# Patient Record
Sex: FEMALE | Race: WHITE | NOT HISPANIC OR LATINO | Employment: OTHER | URBAN - METROPOLITAN AREA
[De-identification: names, ages, dates, MRNs, and addresses within clinical notes are randomized per-mention and may not be internally consistent; named-entity substitution may affect disease eponyms.]

---

## 2018-04-04 ENCOUNTER — OFFICE VISIT (OUTPATIENT)
Dept: URGENT CARE | Facility: CLINIC | Age: 23
End: 2018-04-04
Payer: COMMERCIAL

## 2018-04-04 VITALS
HEART RATE: 65 BPM | WEIGHT: 152 LBS | BODY MASS INDEX: 23.04 KG/M2 | HEIGHT: 68 IN | OXYGEN SATURATION: 100 % | DIASTOLIC BLOOD PRESSURE: 8 MMHG | SYSTOLIC BLOOD PRESSURE: 128 MMHG | TEMPERATURE: 99 F

## 2018-04-04 DIAGNOSIS — R21 RASH: Primary | ICD-10-CM

## 2018-04-04 PROCEDURE — 99203 OFFICE O/P NEW LOW 30 MIN: CPT | Mod: S$GLB,,, | Performed by: EMERGENCY MEDICINE

## 2018-04-04 RX ORDER — METHYLPREDNISOLONE 4 MG/1
4 TABLET ORAL DAILY
Qty: 1 PACKAGE | Refills: 0 | Status: SHIPPED | OUTPATIENT
Start: 2018-04-04 | End: 2018-04-09

## 2018-04-04 NOTE — PROGRESS NOTES
"Subjective:       Patient ID: Phylicia Klein is a 22 y.o. female.    Vitals:  height is 5' 8" (1.727 m) and weight is 68.9 kg (152 lb). Her oral temperature is 98.7 °F (37.1 °C). Her blood pressure is 128/8 (abnormal) and her pulse is 65. Her oxygen saturation is 100%.     Chief Complaint: Rash (whole body)    PT has used lotion      Rash   This is a new problem. The current episode started 1 to 4 weeks ago. The affected locations include the torso, left arm and right arm. The rash is characterized by dryness, itchiness and redness. Pertinent negatives include no fever, joint pain, shortness of breath or sore throat. Past treatments include nothing. The treatment provided no relief.     Review of Systems   Constitution: Negative for chills and fever.   HENT: Negative for sore throat.    Respiratory: Negative for shortness of breath.    Skin: Positive for color change (on back and stomach), itching (arms, legs, torso) and rash (Pt states she contact dermatitis).   Musculoskeletal: Negative for joint pain.       Objective:      Physical Exam   Constitutional: She is oriented to person, place, and time. She appears well-developed and well-nourished.   HENT:   Head: Normocephalic and atraumatic. Head is without abrasion, without contusion and without laceration.   Right Ear: External ear normal.   Left Ear: External ear normal.   Nose: Nose normal.   Mouth/Throat: Oropharynx is clear and moist.   Eyes: Conjunctivae, EOM and lids are normal. Pupils are equal, round, and reactive to light.   Neck: Trachea normal, full passive range of motion without pain and phonation normal. Neck supple.   Cardiovascular: Normal rate, regular rhythm and normal heart sounds.    Pulmonary/Chest: Effort normal and breath sounds normal. No stridor. No respiratory distress.   Musculoskeletal: Normal range of motion.   Neurological: She is alert and oriented to person, place, and time.   Skin: Skin is warm, dry and intact. Capillary refill " takes less than 2 seconds. No abrasion, no bruising, no burn, no ecchymosis, no laceration, no lesion and no rash noted. No erythema.        Psychiatric: She has a normal mood and affect. Her speech is normal and behavior is normal. Judgment and thought content normal. Cognition and memory are normal.   Nursing note and vitals reviewed.      Assessment:       1. Rash        Plan:         Rash  -     methylPREDNISolone (MEDROL DOSEPACK) 4 mg tablet; Take 1 tablet (4 mg total) by mouth once daily. use as directed  Dispense: 1 Package; Refill: 0

## 2018-09-18 ENCOUNTER — OFFICE VISIT (OUTPATIENT)
Dept: URGENT CARE | Facility: CLINIC | Age: 23
End: 2018-09-18
Payer: COMMERCIAL

## 2018-09-18 VITALS
HEIGHT: 68 IN | TEMPERATURE: 98 F | BODY MASS INDEX: 23.04 KG/M2 | OXYGEN SATURATION: 99 % | DIASTOLIC BLOOD PRESSURE: 78 MMHG | SYSTOLIC BLOOD PRESSURE: 121 MMHG | WEIGHT: 152 LBS | HEART RATE: 81 BPM

## 2018-09-18 DIAGNOSIS — J02.9 SORE THROAT: ICD-10-CM

## 2018-09-18 DIAGNOSIS — J02.0 STREP PHARYNGITIS: Primary | ICD-10-CM

## 2018-09-18 LAB
CTP QC/QA: YES
S PYO RRNA THROAT QL PROBE: POSITIVE

## 2018-09-18 PROCEDURE — 87880 STREP A ASSAY W/OPTIC: CPT | Mod: QW,S$GLB,, | Performed by: NURSE PRACTITIONER

## 2018-09-18 PROCEDURE — 99214 OFFICE O/P EST MOD 30 MIN: CPT | Mod: S$GLB,,, | Performed by: NURSE PRACTITIONER

## 2018-09-18 RX ORDER — AMOXICILLIN 500 MG/1
500 CAPSULE ORAL 2 TIMES DAILY
Qty: 20 CAPSULE | Refills: 0 | Status: SHIPPED | OUTPATIENT
Start: 2018-09-18 | End: 2018-09-28

## 2018-09-18 NOTE — PATIENT INSTRUCTIONS
Strep Throat  Strep throat is a throat infection caused by a bacteria called group A Streptococcus bacteria (group A strep). The bacteria live in the nose and throat. Strep throat is contagious and spreads easily from person to person through airborne droplets when an infected person coughs, sneezes, or talks. Good hand washing is important to help prevent the spread of this illness.  Children diagnosed with strep throat should not attend school or  until they have been taking antibiotics and had no fever for 24 hours.  Strep throat mainly affects school-aged children between 5 and 15 years of age, but can affect adults too. When it isn't treated, it can lead to serious problems including rheumatic fever (an inflammation of the joints and heart) and kidney damage.    How is strep throat spread?  Strep throat can be easily spread from an infected person's saliva by:  · Drinking and eating after them  · Sharing a straw, cup, toothbrushes, and eating utensils  When to go to the emergency room (ER)  Call 911 if your child has trouble breathing or swallowing. Call your healthcare provider about other symptoms of strep throat, such as:  · Throat pain, especially when swallowing  · Red, swollen tonsils  · Swollen lymph glands  · Stomachache; sometimes, vomiting in younger children  · Pus in the back of the throat  What to expect in the ER  · Your child will be examined and the healthcare provider will ask about his or her medical history.  · The child's tonsils will be examined. A sample of fluid may be taken from the back of the throat using a soft swab. The sample can be checked right away for the bacteria that cause strep throat. Another sample may also be sent to a lab for testing.  · An antibiotic is usually prescribed to kill the bacteria. Be sure your child takes all the medicine, even if he or she starts to feel better. (Note that antibiotics will not help a viral throat infection.)  · If swallowing is  very painful, painkilling medicine may also be prescribed.  When to call your healthcare provider  Call your healthcare provider if your otherwise healthy child has finished the treatment for strep throat and has:  · Joint pain or swelling  · Shortness of breath  · Signs of dehydration (no tears when crying and not urinating for more than 8 hours)  · Ear pain or pressure  · Headaches  · Rash  · Fever (see Fever and children, below)  Fever and children  Always use a digital thermometer to check your childs temperature. Never use a mercury thermometer.  For infants and toddlers, be sure to use a rectal thermometer correctly. A rectal thermometer may accidentally poke a hole in (perforate) the rectum. It may also pass on germs from the stool. Always follow the product makers directions for proper use. If you dont feel comfortable taking a rectal temperature, use another method. When you talk to your childs healthcare provider, tell him or her which method you used to take your childs temperature.  Here are guidelines for fever temperature. Ear temperatures arent accurate before 6 months of age. Dont take an oral temperature until your child is at least 4 years old.  Infant under 3 months old:  · Ask your childs healthcare provider how you should take the temperature.  · Rectal or forehead (temporal artery) temperature of 100.4°F (38°C) or higher, or as directed by the provider  · Armpit temperature of 99°F (37.2°C) or higher, or as directed by the provider  Child age 3 to 36 months:  · Rectal, forehead (temporal artery), or ear temperature of 102°F (38.9°C) or higher, or as directed by the provider  · Armpit temperature of 101°F (38.3°C) or higher, or as directed by the provider  Child of any age:  · Repeated temperature of 104°F (40°C) or higher, or as directed by the provider  · Fever that lasts more than 24 hours in a child under 2 years old. Or a fever that lasts for 3 days in a child 2 years or older.    Easing strep throat symptoms  These tips can help ease your child's symptoms:  · Offer easy-to-swallow foods, such as soup, applesauce, popsicles, cold drinks, milk shakes, and yogurt.  · Provide a soft diet and avoid spicy or acidic foods.  · Use a cool-mist humidifier in the child's bedroom.  · Gargle with saltwater (for older children and adults only). Mix 1/4 teaspoon salt in 1 cup (8 oz) of warm water.   Date Last Reviewed: 1/1/2017  © 3774-6049 [employer]. [address] [place] [address], [place] [address]. All rights reserved. This information is not intended as a substitute for professional medical care. Always follow your healthcare professional's instructions.

## 2018-09-18 NOTE — LETTER
September 18, 2018      [facility] [address] [address], [address]  [place] [address]  Phone: [phone]  Fax: [phone]       Patient: Phylicia Klein   YOB: 1995  Date of Visit: 09/18/2018    To Whom It May Concern:    Rafael Klein  was at [facility] on 09/18/2018. She may return to work/school on 9/19/18 with no restrictions. If you have any questions or concerns, or if I can be of further assistance, please do not hesitate to contact me.    Sincerely,    Yana Howard NP

## 2018-10-08 ENCOUNTER — TELEPHONE (OUTPATIENT)
Dept: FAMILY MEDICINE | Facility: CLINIC | Age: 23
End: 2018-10-08

## 2018-10-08 NOTE — TELEPHONE ENCOUNTER
Deanna will be coming in tomorrow at 530 PM.  Please add to my schedule.    Issues with anxiety.    Trae Mauricio MD

## 2018-10-09 ENCOUNTER — TELEPHONE (OUTPATIENT)
Dept: FAMILY MEDICINE | Facility: CLINIC | Age: 23
End: 2018-10-09

## 2018-10-10 ENCOUNTER — LAB VISIT (OUTPATIENT)
Dept: LAB | Facility: HOSPITAL | Age: 23
End: 2018-10-10
Attending: EMERGENCY MEDICINE
Payer: COMMERCIAL

## 2018-10-10 ENCOUNTER — TELEPHONE (OUTPATIENT)
Dept: FAMILY MEDICINE | Facility: CLINIC | Age: 23
End: 2018-10-10

## 2018-10-10 ENCOUNTER — OFFICE VISIT (OUTPATIENT)
Dept: FAMILY MEDICINE | Facility: CLINIC | Age: 23
End: 2018-10-10
Payer: COMMERCIAL

## 2018-10-10 VITALS
SYSTOLIC BLOOD PRESSURE: 120 MMHG | HEART RATE: 80 BPM | DIASTOLIC BLOOD PRESSURE: 80 MMHG | WEIGHT: 155.63 LBS | RESPIRATION RATE: 16 BRPM | BODY MASS INDEX: 23.67 KG/M2

## 2018-10-10 DIAGNOSIS — F41.1 GAD (GENERALIZED ANXIETY DISORDER): ICD-10-CM

## 2018-10-10 DIAGNOSIS — F41.1 GAD (GENERALIZED ANXIETY DISORDER): Primary | ICD-10-CM

## 2018-10-10 LAB
BASOPHILS # BLD AUTO: 0.03 K/UL
BASOPHILS NFR BLD: 0.7 %
DIFFERENTIAL METHOD: ABNORMAL
EOSINOPHIL # BLD AUTO: 0 K/UL
EOSINOPHIL NFR BLD: 1 %
ERYTHROCYTE [DISTWIDTH] IN BLOOD BY AUTOMATED COUNT: 12.4 %
HCT VFR BLD AUTO: 39.4 %
HGB BLD-MCNC: 13.7 G/DL
IMM GRANULOCYTES # BLD AUTO: 0 K/UL
IMM GRANULOCYTES NFR BLD AUTO: 0 %
LYMPHOCYTES # BLD AUTO: 1.7 K/UL
LYMPHOCYTES NFR BLD: 40.4 %
MCH RBC QN AUTO: 31.9 PG
MCHC RBC AUTO-ENTMCNC: 34.8 G/DL
MCV RBC AUTO: 92 FL
MONOCYTES # BLD AUTO: 0.4 K/UL
MONOCYTES NFR BLD: 10.7 %
NEUTROPHILS # BLD AUTO: 2 K/UL
NEUTROPHILS NFR BLD: 47.2 %
NRBC BLD-RTO: 0 /100 WBC
PLATELET # BLD AUTO: 260 K/UL
PMV BLD AUTO: 10.6 FL
RBC # BLD AUTO: 4.3 M/UL
WBC # BLD AUTO: 4.13 K/UL

## 2018-10-10 PROCEDURE — 84443 ASSAY THYROID STIM HORMONE: CPT

## 2018-10-10 PROCEDURE — 99999 PR PBB SHADOW E&M-EST. PATIENT-LVL III: CPT | Mod: PBBFAC,,, | Performed by: EMERGENCY MEDICINE

## 2018-10-10 PROCEDURE — 36415 COLL VENOUS BLD VENIPUNCTURE: CPT | Mod: PO

## 2018-10-10 PROCEDURE — 85025 COMPLETE CBC W/AUTO DIFF WBC: CPT

## 2018-10-10 PROCEDURE — 99213 OFFICE O/P EST LOW 20 MIN: CPT | Mod: S$GLB,,, | Performed by: EMERGENCY MEDICINE

## 2018-10-10 PROCEDURE — 3008F BODY MASS INDEX DOCD: CPT | Mod: CPTII,S$GLB,, | Performed by: EMERGENCY MEDICINE

## 2018-10-10 RX ORDER — ESCITALOPRAM OXALATE 5 MG/1
5 TABLET ORAL DAILY
Qty: 30 TABLET | Refills: 2 | Status: SHIPPED | OUTPATIENT
Start: 2018-10-10 | End: 2019-02-02 | Stop reason: SDUPTHER

## 2018-10-10 NOTE — TELEPHONE ENCOUNTER
----- Message from Michael Evans sent at 10/10/2018 12:31 PM CDT -----  Contact: same  Patient called in and stated she was offered an appt for 3pm today 10/10 instead of her 3:30pm and now she can come in for 3pm, if still available.    Patient would like a call back at [phone]

## 2018-10-10 NOTE — PROGRESS NOTES
Subjective:   THIS NOTE IS DONE WITH VOICE RECOGNITION        Patient ID: Phylicia Klein is a 23 y.o. female.    Chief Complaint: Anxiety      HPI     She has graduated from [employer] with a degree in marketing.  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    She has noticed increased anxiety.  She describes herself as having had anxiety for most of her life.  At this point it is interfering with her sleep and her job performance.  She would like to consider medications to address this.    ANABEL-7 Questionnaire    Over the last two weeks, how often have you been bothered by the following problems:  0 Not at all   1 Several days  2 More than half the days  3 Nearly every day    Feeling nervous, anxious, or on edge 2    Not being able to stop or control worrying  1    Worrying too much about different things  3    Trouble relaxing  1    Being so restless that it's hard to sit still 2    Becoming easily annoyed or irritable  3    Feeling afraid as if something awful might happen 3      How difficult have these problems made it for you   to do your work,  take care of things at home, or   get along with other people? Not difficult at all   Somewhat difficult   Very difficult   Extremely difficult    Total Score: 13    Total Score Anxiety Severity  1-4  Minimal anxiety  5-9  Mild anxiety  10-14  Moderate anxiety  15-21  Severe anxiety        No current outpatient medications on file.     No current facility-administered medications for this visit.          Review of Systems   Constitutional: Negative for appetite change, chills, fatigue and fever.   HENT: Negative for ear discharge, hearing loss, rhinorrhea, sneezing and tinnitus.    Eyes: Negative for discharge, itching and visual disturbance.   Respiratory: Negative for cough, choking, shortness of breath and wheezing.    Cardiovascular:  Negative for chest pain, palpitations and leg swelling.   Gastrointestinal: Negative for abdominal distention, abdominal pain, constipation and diarrhea.   Genitourinary: Negative for dyspareunia, dysuria, frequency, hematuria, menstrual problem, pelvic pain, vaginal discharge and vaginal pain.   Musculoskeletal: Negative for arthralgias, back pain and joint swelling.   Skin: Negative.    Neurological: Negative for dizziness, tremors, seizures, syncope, numbness and headaches.   Hematological: Negative for adenopathy. Does not bruise/bleed easily.   Psychiatric/Behavioral: Positive for sleep disturbance. Negative for confusion, dysphoric mood and hallucinations. The patient is nervous/anxious.      gyn:  She denies any possibility of pregnancy.  Her.  Her regular.  She has been screen for polycystic ovary disease.  She is not aware that she has this.    Objective:      Physical Exam   Constitutional: She is oriented to person, place, and time. She appears well-developed and well-nourished. No distress.   HENT:   Head: Normocephalic and atraumatic.   Mouth/Throat: Oropharynx is clear and moist.   Eyes: Conjunctivae are normal. Pupils are equal, round, and reactive to light. No scleral icterus.   Neck: Normal range of motion. Neck supple. No thyromegaly present.   Cardiovascular: Normal rate, regular rhythm and normal heart sounds.  No extrasystoles are present.   Pulmonary/Chest: Effort normal and breath sounds normal. No tachypnea. She has no wheezes. She has no rales.   Abdominal: Soft. Bowel sounds are normal. She exhibits no distension. There is no tenderness.   Musculoskeletal: Normal range of motion. She exhibits no edema or tenderness.        Right shoulder: She exhibits normal range of motion, no tenderness, no bony tenderness, no effusion and no deformity.   Lymphadenopathy:     She has no cervical adenopathy.     She has no axillary adenopathy.        Right: No inguinal and no supraclavicular adenopathy  present.   Neurological: She is alert and oriented to person, place, and time. She has normal reflexes. No cranial nerve deficit. Coordination normal.   Skin: Skin is warm and dry. Capillary refill takes less than 2 seconds. No ecchymosis, no lesion and no rash noted. No erythema.   Psychiatric: She has a normal mood and affect. Her behavior is normal. Judgment and thought content normal. Her mood appears not anxious. She does not exhibit a depressed mood.   Vitals reviewed.      Assessment:       1. ANABEL (generalized anxiety disorder)        Plan:       1. ANABEL (generalized anxiety disorder)  Long standing  Options for intervention reviewed  Mind body interventions discussed.  She is not planning pregnancy in the near future.  She will update me in a month in regards to her Lexapro therapy.    - TSH; Future  - CBC auto differential; Future  - escitalopram oxalate (LEXAPRO) 5 MG Tab; Take 1 tablet (5 mg total) by mouth once daily.  Dispense: 30 tablet; Refill: 2

## 2018-10-11 ENCOUNTER — TELEPHONE (OUTPATIENT)
Dept: FAMILY MEDICINE | Facility: CLINIC | Age: 23
End: 2018-10-11

## 2018-10-11 LAB — TSH SERPL DL<=0.005 MIU/L-ACNC: 0.93 UIU/ML

## 2018-10-14 PROBLEM — F41.1 GAD (GENERALIZED ANXIETY DISORDER): Chronic | Status: ACTIVE | Noted: 2018-10-14

## 2018-10-14 NOTE — PATIENT INSTRUCTIONS
Deanna,    Lab Results   Component Value Date    TSH 0.925 10/10/2018     Lab Results   Component Value Date    WBC 4.13 10/10/2018    HGB 13.7 10/10/2018    HCT 39.4 10/10/2018    MCV 92 10/10/2018     10/10/2018     I expected this blood work to be normal and indeed it is.    Please try the Lexapro, being consistent on a daily basis.  I would like to know what is happening after 1 month.    It is important for you to take some accountability for this and try to do some good things for yourself.  This is predominantly around getting enough exercise and sleep.    Trae Mauricio MD

## 2018-11-27 ENCOUNTER — OFFICE VISIT (OUTPATIENT)
Dept: OBSTETRICS AND GYNECOLOGY | Facility: CLINIC | Age: 23
End: 2018-11-27
Payer: COMMERCIAL

## 2018-11-27 VITALS
WEIGHT: 156.75 LBS | SYSTOLIC BLOOD PRESSURE: 116 MMHG | BODY MASS INDEX: 23.83 KG/M2 | DIASTOLIC BLOOD PRESSURE: 72 MMHG

## 2018-11-27 DIAGNOSIS — Z01.419 ENCOUNTER FOR GYNECOLOGICAL EXAMINATION WITHOUT ABNORMAL FINDING: Primary | ICD-10-CM

## 2018-11-27 PROCEDURE — 88175 CYTOPATH C/V AUTO FLUID REDO: CPT

## 2018-11-27 PROCEDURE — 99999 PR PBB SHADOW E&M-EST. PATIENT-LVL III: CPT | Mod: PBBFAC,,, | Performed by: OBSTETRICS & GYNECOLOGY

## 2018-11-27 PROCEDURE — 99385 PREV VISIT NEW AGE 18-39: CPT | Mod: S$GLB,,, | Performed by: OBSTETRICS & GYNECOLOGY

## 2018-11-27 NOTE — PROGRESS NOTES
Chief Complaint   Patient presents with    Well Woman       History of Present Illness: Phylicia Klein is a 23 y.o. female that presents today 11/27/2018 for well gyn visit.    History reviewed. No pertinent past medical history.    Past Surgical History:   Procedure Laterality Date    ANTERIOR CRUCIATE LIGAMENT REPAIR  2010       Current Outpatient Medications   Medication Sig Dispense Refill    escitalopram oxalate (LEXAPRO) 5 MG Tab Take 1 tablet (5 mg total) by mouth once daily. 30 tablet 2     No current facility-administered medications for this visit.        Review of patient's allergies indicates:   Allergen Reactions    Sulfa (sulfonamide antibiotics)      Other reaction(s): Hives    Sulfamethoxazole      Other reaction(s): Hives    Trimethoprim      Other reaction(s): Hives       Family History   Problem Relation Age of Onset    No Known Problems Mother     No Known Problems Father     No Known Problems Sister     Psoriasis Maternal Aunt     Heart disease Maternal Aunt 42        CABG    Psoriasis Maternal Uncle     Psoriasis Maternal Grandfather     Heart disease Maternal Grandfather 46        cv disease    Eczema Neg Hx     Melanoma Neg Hx        Social History     Socioeconomic History    Marital status: Single     Spouse name: None    Number of children: None    Years of education: None    Highest education level: None   Social Needs    Financial resource strain: None    Food insecurity - worry: None    Food insecurity - inability: None    Transportation needs - medical: None    Transportation needs - non-medical: None   Occupational History    Occupation: Student   Tobacco Use    Smoking status: Never Smoker    Smokeless tobacco: Never Used   Substance and Sexual Activity    Alcohol use: Yes     Comment: once Q 2 weeks    Drug use: No    Sexual activity: No     Birth control/protection: None   Other Topics Concern    Are you pregnant or think you may be? No     Breast-feeding No   Social History Narrative    None       OB History    Para Term  AB Living   0 0 0 0 0 0   SAB TAB Ectopic Multiple Live Births   0 0 0 0               Review of Symptoms:  GENERAL: Denies weight gain or weight loss. Feeling well overall.   SKIN: Denies rash or lesions.   HEAD: Denies head injury or headache.   NODES: Denies enlarged lymph nodes.   CHEST: Denies chest pain or shortness of breath.   CARDIOVASCULAR: Denies palpitations or left sided chest pain.   ABDOMEN: No abdominal pain, constipation, diarrhea, nausea, vomiting or rectal bleeding.   URINARY: No frequency, dysuria, hematuria, or burning on urination.  HEMATOLOGIC: No easy bruisability or excessive bleeding.   MUSCULOSKELETAL: Denies joint pain or swelling.     /72   Wt 71.1 kg (156 lb 12 oz)   LMP 2018   Physical Exam:  APPEARANCE: Well nourished, well developed, in no acute distress.  SKIN: Normal skin turgor, no lesions.  NECK: Neck symmetric without masses   RESPIRATORY: Normal respiratory effort with no retractions or use of accessory muscles  CARDIOVASCULAR: Peripheral vascular system with no swelling no varicosities and palpation of pulses normal  LYMPHATIC: No enlargements of the lymph nodes noted in the neck, axillae, or groin  ABDOMEN: Soft. No tenderness or masses. No hepatosplenomegaly. No hernias.  BREASTS: Symmetrical, no skin changes or visible lesions. No palpable masses, nipple discharge or adenopathy bilaterally.  PELVIC: Normal external female genitalia without lesions. Normal hair distribution. Adequate perineal body, normal urethral meatus. Urethra with no masses.  Bladder nontender. Vagina moist and well rugated without lesions or discharge. Cervix pink and without lesions. No significant cystocele or rectocele. Bimanual exam showed uterus normal size, shape, position, mobile and nontender. Adnexa without masses or tenderness. Urethra and bladder normal.   EXTREMITIES: No clubbing  cyanosis or edema.    ASSESSMENT/PLAN:  Encounter for gynecological examination without abnormal finding  -     Liquid-based pap smear, screening          Patient was counseled today on Pap guidelines, recommendation for pelvic exams, mammograms every other year after the age of 40 and annually after the age of 50, Colonoscopy after the age of 50, Dexa Bone Scan and calcium and vitamin D supplementation in menopause and to see her PCP for other health maintenance.   FOLLOW-UP:prn

## 2019-01-14 ENCOUNTER — OFFICE VISIT (OUTPATIENT)
Dept: FAMILY MEDICINE | Facility: CLINIC | Age: 24
End: 2019-01-14
Payer: COMMERCIAL

## 2019-01-14 VITALS
DIASTOLIC BLOOD PRESSURE: 64 MMHG | BODY MASS INDEX: 23.83 KG/M2 | RESPIRATION RATE: 12 BRPM | HEART RATE: 72 BPM | WEIGHT: 156.75 LBS | SYSTOLIC BLOOD PRESSURE: 106 MMHG

## 2019-01-14 DIAGNOSIS — F41.1 GAD (GENERALIZED ANXIETY DISORDER): Primary | Chronic | ICD-10-CM

## 2019-01-14 PROCEDURE — 99213 OFFICE O/P EST LOW 20 MIN: CPT | Mod: S$GLB,,, | Performed by: EMERGENCY MEDICINE

## 2019-01-14 PROCEDURE — 99999 PR PBB SHADOW E&M-EST. PATIENT-LVL III: CPT | Mod: PBBFAC,,, | Performed by: EMERGENCY MEDICINE

## 2019-01-14 PROCEDURE — 3008F BODY MASS INDEX DOCD: CPT | Mod: CPTII,S$GLB,, | Performed by: EMERGENCY MEDICINE

## 2019-01-14 PROCEDURE — 99999 PR PBB SHADOW E&M-EST. PATIENT-LVL III: ICD-10-PCS | Mod: PBBFAC,,, | Performed by: EMERGENCY MEDICINE

## 2019-01-14 PROCEDURE — 3008F PR BODY MASS INDEX (BMI) DOCUMENTED: ICD-10-PCS | Mod: CPTII,S$GLB,, | Performed by: EMERGENCY MEDICINE

## 2019-01-14 PROCEDURE — 99213 PR OFFICE/OUTPT VISIT, EST, LEVL III, 20-29 MIN: ICD-10-PCS | Mod: S$GLB,,, | Performed by: EMERGENCY MEDICINE

## 2019-01-14 NOTE — PROGRESS NOTES
Subjective:   THIS NOTE IS DONE WITH VOICE RECOGNITION        Patient ID: Phylicia Klein is a 23 y.o. female.    Chief Complaint: Anxiety      HPI   She took her Lexapro for about 6 weeks.  She did notice any substantial change in her anxiety levels..      There was significant change in her social status, as she is now engage.  She is also working for herself.  Both of these are major factors in her feeling less pressure in stress.    Getting  this coming fall    ANABEL-7 Questionnaire    Over the last two weeks, how often have you been bothered by the following problems:  0 Not at all   1 Several days  2 More than half the days  3 Nearly every day    Feeling nervous, anxious, or on edge 1    Not being able to stop or control worrying  1    Worrying too much about different things  1    Trouble relaxing  2    Being so restless that it's hard to sit still 0    Becoming easily annoyed or irritable  2    Feeling afraid as if something awful might happen 1      How difficult have these problems made it for you   to do your work,  take care of things at home, or   get along with other people? Not difficult at all   Somewhat difficult   Very difficult   Extremely difficult    Total Score: 8    Total Score Anxiety Severity  1-4  Minimal anxiety  5-9  Mild anxiety  10-14  Moderate anxiety  15-21  Severe anxiety      Her levels on previous scoring were 13 or 14.        Current Outpatient Medications   Medication Sig Dispense Refill    escitalopram oxalate (LEXAPRO) 5 MG Tab Take 1 tablet (5 mg total) by mouth once daily. 30 tablet 2     No current facility-administered medications for this visit.          Review of Systems   Constitutional: Negative for appetite change, chills, fatigue and fever.   HENT: Negative for ear discharge, hearing loss, rhinorrhea, sneezing and tinnitus.    Eyes: Negative for discharge, itching and visual disturbance.   Respiratory: Negative for cough, choking, shortness of breath and  wheezing.    Cardiovascular: Negative for chest pain, palpitations and leg swelling.   Gastrointestinal: Negative for abdominal distention.   Genitourinary: Negative for dyspareunia, dysuria, frequency, hematuria, menstrual problem, pelvic pain, vaginal discharge and vaginal pain.   Musculoskeletal: Negative for arthralgias, back pain and joint swelling.   Neurological: Negative for dizziness, tremors, seizures, syncope, numbness and headaches.   Hematological: Negative for adenopathy. Does not bruise/bleed easily.   Psychiatric/Behavioral: Negative for confusion, dysphoric mood and hallucinations. The patient is nervous/anxious.        Objective:      Physical Exam   Constitutional: She is oriented to person, place, and time. She appears well-developed and well-nourished.   HENT:   Head: Normocephalic.   Eyes: Pupils are equal, round, and reactive to light.   Neck: Normal range of motion.   Cardiovascular: Normal rate and regular rhythm.   Pulmonary/Chest: Effort normal.   Abdominal: She exhibits no distension.   Neurological: She is alert and oriented to person, place, and time.   Skin: No rash noted.   Vitals reviewed.      Assessment:       1. ANABEL (generalized anxiety disorder)        Plan:       1. ANABEL (generalized anxiety disorder)  She has made changes in her work and personal life that have helped with the anxiety  She is aware that she has an issue with worry/anxiety.  She would like to have access to Lexapro if she needs it.  She is aware that this will not be a fix for a 1 day kind of problem, but if she took it regularly it could.  If she restarts Lexapro she will let me know.    She has found that being able to talk about the way she feels has been very helpful for her.  I have encouraged her to continue to fine individuals that she has confidence and that she can discuss openly and freely how she is feeling.    We will follow up with her as needed.

## 2019-02-02 DIAGNOSIS — F41.1 GAD (GENERALIZED ANXIETY DISORDER): ICD-10-CM

## 2019-02-02 RX ORDER — ESCITALOPRAM OXALATE 5 MG/1
TABLET ORAL
Qty: 30 TABLET | Refills: 2 | Status: SHIPPED | OUTPATIENT
Start: 2019-02-02 | End: 2019-07-01 | Stop reason: ALTCHOICE

## 2019-05-06 ENCOUNTER — OFFICE VISIT (OUTPATIENT)
Dept: DERMATOLOGY | Facility: CLINIC | Age: 24
End: 2019-05-06
Payer: COMMERCIAL

## 2019-05-06 DIAGNOSIS — L73.9 FOLLICULITIS: Primary | ICD-10-CM

## 2019-05-06 DIAGNOSIS — L85.8 KP (KERATOSIS PILARIS): ICD-10-CM

## 2019-05-06 PROCEDURE — 99202 PR OFFICE/OUTPT VISIT, NEW, LEVL II, 15-29 MIN: ICD-10-PCS | Mod: S$GLB,,, | Performed by: DERMATOLOGY

## 2019-05-06 PROCEDURE — 99202 OFFICE O/P NEW SF 15 MIN: CPT | Mod: S$GLB,,, | Performed by: DERMATOLOGY

## 2019-05-06 PROCEDURE — 99999 PR PBB SHADOW E&M-EST. PATIENT-LVL II: ICD-10-PCS | Mod: PBBFAC,,, | Performed by: DERMATOLOGY

## 2019-05-06 PROCEDURE — 99999 PR PBB SHADOW E&M-EST. PATIENT-LVL II: CPT | Mod: PBBFAC,,, | Performed by: DERMATOLOGY

## 2019-05-06 NOTE — PROGRESS NOTES
Subjective:       Patient ID:  Phylicia Klein is a 23 y.o. female who presents for   Chief Complaint   Patient presents with    Rash     Pt c/o itchy rash on upper back and abdomen x 4 years. Tx with TAC cream. Tx did not help.feels the rash is always present but intermittently flares.  Feels she is mostly very itchy when she gets out of the shower, amber after she shaves her legs.       Review of Systems   Constitutional: Negative for fever and chills.   Skin: Positive for itching and rash.   Allergic/Immunologic: Negative for environmental allergies.        Objective:    Physical Exam   Constitutional: She appears well-developed and well-nourished. No distress.   Neurological: She is alert and oriented to person, place, and time. She is not disoriented.   Psychiatric: She has a normal mood and affect.   Skin:   Areas Examined (abnormalities noted in diagram):   Abdomen Inspection Performed  Back Inspection Performed              Diagram Legend     Erythematous scaling macule/papule c/w actinic keratosis       Vascular papule c/w angioma      Pigmented verrucoid papule/plaque c/w seborrheic keratosis      Yellow umbilicated papule c/w sebaceous hyperplasia      Irregularly shaped tan macule c/w lentigo     1-2 mm smooth white papules consistent with Milia      Movable subcutaneous cyst with punctum c/w epidermal inclusion cyst      Subcutaneous movable cyst c/w pilar cyst      Firm pink to brown papule c/w dermatofibroma      Pedunculated fleshy papule(s) c/w skin tag(s)      Evenly pigmented macule c/w junctional nevus     Mildly variegated pigmented, slightly irregular-bordered macule c/w mildly atypical nevus      Flesh colored to evenly pigmented papule c/w intradermal nevus       Pink pearly papule/plaque c/w basal cell carcinoma      Erythematous hyperkeratotic cursted plaque c/w SCC      Surgical scar with no sign of skin cancer recurrence      Open and closed comedones      Inflammatory papules and  pustules      Verrucoid papule consistent consistent with wart     Erythematous eczematous patches and plaques     Dystrophic onycholytic nail with subungual debris c/w onychomycosis     Umbilicated papule    Erythematous-base heme-crusted tan verrucoid plaque consistent with inflamed seborrheic keratosis     Erythematous Silvery Scaling Plaque c/w Psoriasis     See annotation      Assessment / Plan:        Folliculitis  -     benzoyl peroxide (BENZEPRO) 6 % Towl; Use daily in shower to trunk  Dispense: 1 each; Refill: 5  -     sarecycline 100 mg Tab; Sig 1po qd with food  Dispense: 30 tablet; Refill: 3  Laser milagro removal - pure derm, [facility], dr nathan CALI (keratosis pilaris)  Cerave sa  cerave hydrating bar            Follow up in about 6 weeks (around 6/17/2019).

## 2019-07-01 ENCOUNTER — OFFICE VISIT (OUTPATIENT)
Dept: DERMATOLOGY | Facility: CLINIC | Age: 24
End: 2019-07-01
Payer: COMMERCIAL

## 2019-07-01 DIAGNOSIS — L73.9 FOLLICULITIS: Primary | ICD-10-CM

## 2019-07-01 DIAGNOSIS — L85.8 KP (KERATOSIS PILARIS): ICD-10-CM

## 2019-07-01 PROCEDURE — 99999 PR PBB SHADOW E&M-EST. PATIENT-LVL II: CPT | Mod: PBBFAC,,, | Performed by: DERMATOLOGY

## 2019-07-01 PROCEDURE — 99213 OFFICE O/P EST LOW 20 MIN: CPT | Mod: S$GLB,,, | Performed by: DERMATOLOGY

## 2019-07-01 PROCEDURE — 99213 PR OFFICE/OUTPT VISIT, EST, LEVL III, 20-29 MIN: ICD-10-PCS | Mod: S$GLB,,, | Performed by: DERMATOLOGY

## 2019-07-01 PROCEDURE — 87070 CULTURE OTHR SPECIMN AEROBIC: CPT

## 2019-07-01 PROCEDURE — 99999 PR PBB SHADOW E&M-EST. PATIENT-LVL II: ICD-10-PCS | Mod: PBBFAC,,, | Performed by: DERMATOLOGY

## 2019-07-01 NOTE — PROGRESS NOTES
Subjective:       Patient ID:  Phylicia Klein is a 23 y.o. female who presents for   Chief Complaint   Patient presents with    Folliculitis     Pt presnets for 6 week folliculitis follow up.  Back, legs , arms.  About the same.  tx w benzepro towels 3x/weekly. Dry skin out.  Also using sarecycline 100mg daily.  No problems.  Feels rash is still coming and going.   Saving up for laser hair removal    Folliculitis         Review of Systems   HENT: Negative for nosebleeds and headaches.    Gastrointestinal: Negative for diarrhea and Sensitivity to oral antibiotics.   Genitourinary: Negative for irregular periods.   Musculoskeletal: Negative for arthralgias.   Skin: Positive for activity-related sunscreen use. Negative for daily sunscreen use and recent sunburn.   Neurological: Negative for headaches.   Psychiatric/Behavioral: Negative for depressed mood.        Objective:    Physical Exam   Constitutional: She appears well-developed and well-nourished. No distress.   Neurological: She is alert and oriented to person, place, and time. She is not disoriented.   Psychiatric: She has a normal mood and affect.   Skin:   Areas Examined (abnormalities noted in diagram):   Chest / Axilla Inspection Performed  Abdomen Inspection Performed  Back Inspection Performed  RUE Inspected  LUE Inspection Performed  RLE Inspected  LLE Inspection Performed              Diagram Legend     Erythematous scaling macule/papule c/w actinic keratosis       Vascular papule c/w angioma      Pigmented verrucoid papule/plaque c/w seborrheic keratosis      Yellow umbilicated papule c/w sebaceous hyperplasia      Irregularly shaped tan macule c/w lentigo     1-2 mm smooth white papules consistent with Milia      Movable subcutaneous cyst with punctum c/w epidermal inclusion cyst      Subcutaneous movable cyst c/w pilar cyst      Firm pink to brown papule c/w dermatofibroma      Pedunculated fleshy papule(s) c/w skin tag(s)      Evenly pigmented  macule c/w junctional nevus     Mildly variegated pigmented, slightly irregular-bordered macule c/w mildly atypical nevus      Flesh colored to evenly pigmented papule c/w intradermal nevus       Pink pearly papule/plaque c/w basal cell carcinoma      Erythematous hyperkeratotic cursted plaque c/w SCC      Surgical scar with no sign of skin cancer recurrence      Open and closed comedones      Inflammatory papules and pustules      Verrucoid papule consistent consistent with wart     Erythematous eczematous patches and plaques     Dystrophic onycholytic nail with subungual debris c/w onychomycosis     Umbilicated papule    Erythematous-base heme-crusted tan verrucoid plaque consistent with inflamed seborrheic keratosis     Erythematous Silvery Scaling Plaque c/w Psoriasis     See annotation      Assessment / Plan:        Folliculitis  Still with many pustules despite saracycline and BPO wipes  Will do culture  Consider spironolactone v other v pitysporum folliculitis  Pt is getting back on OCP  -     Aerobic culture    KP (keratosis pilaris)  cerave sa           Follow up if symptoms worsen or fail to improve.

## 2019-07-05 ENCOUNTER — PATIENT MESSAGE (OUTPATIENT)
Dept: DERMATOLOGY | Facility: CLINIC | Age: 24
End: 2019-07-05

## 2019-07-05 DIAGNOSIS — L73.8 PITYROSPORUM FOLLICULITIS: Primary | ICD-10-CM

## 2019-07-05 LAB — BACTERIA SPEC AEROBE CULT: NO GROWTH

## 2019-07-05 RX ORDER — FLUCONAZOLE 200 MG/1
TABLET ORAL
Qty: 4 TABLET | Refills: 2 | Status: SHIPPED | OUTPATIENT
Start: 2019-07-05 | End: 2019-09-16 | Stop reason: SDUPTHER

## 2019-07-05 RX ORDER — KETOCONAZOLE 20 MG/ML
SHAMPOO, SUSPENSION TOPICAL
Qty: 120 ML | Refills: 5 | Status: SHIPPED | OUTPATIENT
Start: 2019-07-05 | End: 2020-07-27

## 2019-07-09 ENCOUNTER — OFFICE VISIT (OUTPATIENT)
Dept: OBSTETRICS AND GYNECOLOGY | Facility: CLINIC | Age: 24
End: 2019-07-09
Payer: COMMERCIAL

## 2019-07-09 VITALS
HEIGHT: 68 IN | BODY MASS INDEX: 22.12 KG/M2 | SYSTOLIC BLOOD PRESSURE: 122 MMHG | DIASTOLIC BLOOD PRESSURE: 70 MMHG | WEIGHT: 145.94 LBS

## 2019-07-09 DIAGNOSIS — L70.9 ACNE, UNSPECIFIED ACNE TYPE: ICD-10-CM

## 2019-07-09 DIAGNOSIS — Z30.011 ENCOUNTER FOR INITIAL PRESCRIPTION OF CONTRACEPTIVE PILLS: ICD-10-CM

## 2019-07-09 PROCEDURE — 99213 OFFICE O/P EST LOW 20 MIN: CPT | Mod: S$GLB,,, | Performed by: OBSTETRICS & GYNECOLOGY

## 2019-07-09 PROCEDURE — 99999 PR PBB SHADOW E&M-EST. PATIENT-LVL III: CPT | Mod: PBBFAC,,, | Performed by: OBSTETRICS & GYNECOLOGY

## 2019-07-09 PROCEDURE — 99999 PR PBB SHADOW E&M-EST. PATIENT-LVL III: ICD-10-PCS | Mod: PBBFAC,,, | Performed by: OBSTETRICS & GYNECOLOGY

## 2019-07-09 PROCEDURE — 3008F PR BODY MASS INDEX (BMI) DOCUMENTED: ICD-10-PCS | Mod: CPTII,S$GLB,, | Performed by: OBSTETRICS & GYNECOLOGY

## 2019-07-09 PROCEDURE — 3008F BODY MASS INDEX DOCD: CPT | Mod: CPTII,S$GLB,, | Performed by: OBSTETRICS & GYNECOLOGY

## 2019-07-09 PROCEDURE — 99213 PR OFFICE/OUTPT VISIT, EST, LEVL III, 20-29 MIN: ICD-10-PCS | Mod: S$GLB,,, | Performed by: OBSTETRICS & GYNECOLOGY

## 2019-07-09 RX ORDER — NORETHINDRONE ACETATE AND ETHINYL ESTRADIOL .02; 1 MG/1; MG/1
1 TABLET ORAL DAILY
Qty: 30 TABLET | Refills: 11 | Status: SHIPPED | OUTPATIENT
Start: 2019-07-09 | End: 2020-01-13

## 2019-07-09 NOTE — PROGRESS NOTES
Chief Complaint   Patient presents with    Contraception       History of Present Illness: Phylicia Klein is a 23 y.o. female that presents today 2019 for   Chief Complaint   Patient presents with    Contraception         History reviewed. No pertinent past medical history.    Past Surgical History:   Procedure Laterality Date    ANTERIOR CRUCIATE LIGAMENT REPAIR         Current Outpatient Medications   Medication Sig Dispense Refill    benzoyl peroxide (BENZEPRO) 6 % Towl Use daily in shower to trunk 1 each 5    sarecycline 100 mg Tab Sig 1po qd with food 30 tablet 3    fluconazole (DIFLUCAN) 200 MG Tab 1 po qweek 4 tablet 2    ketoconazole (NIZORAL) 2 % shampoo Wash trunk with medicated shampoo at least 2x/week - let sit on affected areas at least 5 minutes prior to rinsing 120 mL 5    norethindrone-ethinyl estradiol (MICROGESTIN ) 1-20 mg-mcg per tablet Take 1 tablet by mouth once daily. 30 tablet 11     No current facility-administered medications for this visit.        Review of patient's allergies indicates:   Allergen Reactions    Sulfa (sulfonamide antibiotics)      Other reaction(s): Hives    Sulfamethoxazole      Other reaction(s): Hives    Trimethoprim      Other reaction(s): Hives       Family History   Problem Relation Age of Onset    No Known Problems Mother     No Known Problems Father     No Known Problems Sister     Psoriasis Maternal Aunt     Heart disease Maternal Aunt 42        CABG    Breast cancer Maternal Aunt     Psoriasis Maternal Uncle     Psoriasis Maternal Grandfather     Heart disease Maternal Grandfather 46        cv disease    Breast cancer Maternal Aunt     Eczema Neg Hx     Melanoma Neg Hx     Ovarian cancer Neg Hx        Social History     Tobacco Use    Smoking status: Never Smoker    Smokeless tobacco: Never Used   Substance Use Topics    Alcohol use: Yes     Comment: once or twice a week    Drug use: No       OB History    Para  "Term  AB Living   0 0 0 0 0 0   SAB TAB Ectopic Multiple Live Births   0 0 0 0         Review of Symptoms:  GENERAL: Denies weight gain or weight loss. Feeling well overall.   SKIN: Denies rash or lesions.   HEAD: Denies head injury or headache.   NODES: Denies enlarged lymph nodes.   CHEST: Denies chest pain or shortness of breath.   CARDIOVASCULAR: Denies palpitations or left sided chest pain.   ABDOMEN: No abdominal pain, constipation, diarrhea, nausea, vomiting or rectal bleeding.   URINARY: No frequency, dysuria, hematuria, or burning on urination.  HEMATOLOGIC: No easy bruisability or excessive bleeding.   MUSCULOSKELETAL: Denies joint pain or swelling.     /70   Ht 5' 8" (1.727 m)   Wt 66.2 kg (145 lb 15.1 oz)   LMP 2019     ASSESSMENT/PLAN:  Encounter for initial prescription of contraceptive pills  -     norethindrone-ethinyl estradiol (MICROGESTIN /20) 1-20 mg-mcg per tablet; Take 1 tablet by mouth once daily.  Dispense: 30 tablet; Refill: 11    Acne, unspecified acne type      15 minutes spent today with this patient. Greater than half spent in counseling today.     auth fidelia anderson      "

## 2019-07-25 ENCOUNTER — PATIENT MESSAGE (OUTPATIENT)
Dept: DERMATOLOGY | Facility: CLINIC | Age: 24
End: 2019-07-25

## 2019-08-05 ENCOUNTER — PATIENT MESSAGE (OUTPATIENT)
Dept: DERMATOLOGY | Facility: CLINIC | Age: 24
End: 2019-08-05

## 2019-08-19 ENCOUNTER — PATIENT MESSAGE (OUTPATIENT)
Dept: OBSTETRICS AND GYNECOLOGY | Facility: CLINIC | Age: 24
End: 2019-08-19

## 2019-08-21 ENCOUNTER — OFFICE VISIT (OUTPATIENT)
Dept: OBSTETRICS AND GYNECOLOGY | Facility: CLINIC | Age: 24
End: 2019-08-21
Payer: COMMERCIAL

## 2019-08-21 VITALS
BODY MASS INDEX: 22.49 KG/M2 | SYSTOLIC BLOOD PRESSURE: 122 MMHG | HEIGHT: 68 IN | DIASTOLIC BLOOD PRESSURE: 78 MMHG | WEIGHT: 148.38 LBS

## 2019-08-21 DIAGNOSIS — Z30.430 ENCOUNTER FOR IUD INSERTION: Primary | ICD-10-CM

## 2019-08-21 LAB
B-HCG UR QL: NEGATIVE
CTP QC/QA: YES

## 2019-08-21 PROCEDURE — 99499 NO LOS: ICD-10-PCS | Mod: S$GLB,,, | Performed by: OBSTETRICS & GYNECOLOGY

## 2019-08-21 PROCEDURE — 58300 PR INSERT INTRAUTERINE DEVICE: ICD-10-PCS | Mod: S$GLB,,, | Performed by: OBSTETRICS & GYNECOLOGY

## 2019-08-21 PROCEDURE — 87491 CHLMYD TRACH DNA AMP PROBE: CPT

## 2019-08-21 PROCEDURE — 99999 PR PBB SHADOW E&M-EST. PATIENT-LVL III: ICD-10-PCS | Mod: PBBFAC,,, | Performed by: OBSTETRICS & GYNECOLOGY

## 2019-08-21 PROCEDURE — 58300 INSERT INTRAUTERINE DEVICE: CPT | Mod: S$GLB,,, | Performed by: OBSTETRICS & GYNECOLOGY

## 2019-08-21 PROCEDURE — 81025 POCT URINE PREGNANCY: ICD-10-PCS | Mod: S$GLB,,, | Performed by: OBSTETRICS & GYNECOLOGY

## 2019-08-21 PROCEDURE — 81025 URINE PREGNANCY TEST: CPT | Mod: S$GLB,,, | Performed by: OBSTETRICS & GYNECOLOGY

## 2019-08-21 PROCEDURE — 99499 UNLISTED E&M SERVICE: CPT | Mod: S$GLB,,, | Performed by: OBSTETRICS & GYNECOLOGY

## 2019-08-21 PROCEDURE — 99999 PR PBB SHADOW E&M-EST. PATIENT-LVL III: CPT | Mod: PBBFAC,,, | Performed by: OBSTETRICS & GYNECOLOGY

## 2019-08-22 LAB
C TRACH DNA SPEC QL NAA+PROBE: NOT DETECTED
N GONORRHOEA DNA SPEC QL NAA+PROBE: NOT DETECTED

## 2019-08-28 ENCOUNTER — PATIENT MESSAGE (OUTPATIENT)
Dept: OBSTETRICS AND GYNECOLOGY | Facility: CLINIC | Age: 24
End: 2019-08-28

## 2019-09-15 ENCOUNTER — PATIENT MESSAGE (OUTPATIENT)
Dept: DERMATOLOGY | Facility: CLINIC | Age: 24
End: 2019-09-15

## 2019-09-16 DIAGNOSIS — L73.8 PITYROSPORUM FOLLICULITIS: ICD-10-CM

## 2019-09-16 RX ORDER — FLUCONAZOLE 200 MG/1
TABLET ORAL
Qty: 4 TABLET | Refills: 2 | Status: SHIPPED | OUTPATIENT
Start: 2019-09-16 | End: 2019-10-16 | Stop reason: SDUPTHER

## 2019-09-20 ENCOUNTER — PATIENT MESSAGE (OUTPATIENT)
Dept: OBSTETRICS AND GYNECOLOGY | Facility: CLINIC | Age: 24
End: 2019-09-20

## 2019-09-23 ENCOUNTER — OFFICE VISIT (OUTPATIENT)
Dept: DERMATOLOGY | Facility: CLINIC | Age: 24
End: 2019-09-23
Payer: COMMERCIAL

## 2019-09-23 DIAGNOSIS — B36.0 TINEA VERSICOLOR: Primary | ICD-10-CM

## 2019-09-23 PROCEDURE — 99999 PR PBB SHADOW E&M-EST. PATIENT-LVL II: ICD-10-PCS | Mod: PBBFAC,,, | Performed by: DERMATOLOGY

## 2019-09-23 PROCEDURE — 99999 PR PBB SHADOW E&M-EST. PATIENT-LVL II: CPT | Mod: PBBFAC,,, | Performed by: DERMATOLOGY

## 2019-09-23 PROCEDURE — 99213 OFFICE O/P EST LOW 20 MIN: CPT | Mod: S$GLB,,, | Performed by: DERMATOLOGY

## 2019-09-23 PROCEDURE — 99213 PR OFFICE/OUTPT VISIT, EST, LEVL III, 20-29 MIN: ICD-10-PCS | Mod: S$GLB,,, | Performed by: DERMATOLOGY

## 2019-09-23 RX ORDER — KETOCONAZOLE 2 G/100G
AEROSOL, FOAM TOPICAL
Qty: 100 G | Refills: 3 | Status: SHIPPED | OUTPATIENT
Start: 2019-09-23 | End: 2023-07-31

## 2019-09-23 RX ORDER — CICLOPIROX 1 G/100ML
SHAMPOO TOPICAL
Qty: 120 ML | Refills: 5 | Status: SHIPPED | OUTPATIENT
Start: 2019-09-23 | End: 2020-07-27

## 2019-09-23 NOTE — PROGRESS NOTES
Subjective:       Patient ID:  Pyhlicia Klein is a 24 y.o. female who presents for   Chief Complaint   Patient presents with    Folliculitis     Pt presents today for folliculitis f/u, pt states improving, tx Diflucan; pt takes 200 mg q week and is clear. Finds she was off for 2 weeks and flared. No longer on OCP, has IUD;      Review of Systems   Constitutional: Negative for fever, chills, fatigue and malaise.   Skin: Positive for itching, rash and dry skin.        Objective:    Physical Exam   Constitutional: She appears well-developed and well-nourished. No distress.   Neurological: She is alert and oriented to person, place, and time. She is not disoriented.   Psychiatric: She has a normal mood and affect.   Skin:   Areas Examined (abnormalities noted in diagram):   Chest / Axilla Inspection Performed  Abdomen Inspection Performed  Back Inspection Performed  RUE Inspected  LUE Inspection Performed  RLE Inspected  LLE Inspection Performed              Diagram Legend     Erythematous scaling macule/papule c/w actinic keratosis       Vascular papule c/w angioma      Pigmented verrucoid papule/plaque c/w seborrheic keratosis      Yellow umbilicated papule c/w sebaceous hyperplasia      Irregularly shaped tan macule c/w lentigo     1-2 mm smooth white papules consistent with Milia      Movable subcutaneous cyst with punctum c/w epidermal inclusion cyst      Subcutaneous movable cyst c/w pilar cyst      Firm pink to brown papule c/w dermatofibroma      Pedunculated fleshy papule(s) c/w skin tag(s)      Evenly pigmented macule c/w junctional nevus     Mildly variegated pigmented, slightly irregular-bordered macule c/w mildly atypical nevus      Flesh colored to evenly pigmented papule c/w intradermal nevus       Pink pearly papule/plaque c/w basal cell carcinoma      Erythematous hyperkeratotic cursted plaque c/w SCC      Surgical scar with no sign of skin cancer recurrence      Open and closed comedones       Inflammatory papules and pustules      Verrucoid papule consistent consistent with wart     Erythematous eczematous patches and plaques     Dystrophic onycholytic nail with subungual debris c/w onychomycosis     Umbilicated papule    Erythematous-base heme-crusted tan verrucoid plaque consistent with inflamed seborrheic keratosis     Erythematous Silvery Scaling Plaque c/w Psoriasis     See annotation      Assessment / Plan:        Tinea versicolor/pitysporum folliculitis  Pt has failed BPO wash and multiple courses of oral abx. Diflucan she says is only thing that has helped  Diflucan q week x 2 more months - pt getting  in nov then will try to control topically only   -     ketoconazole (EXTINA) 2 % Foam; AAA trunk bid  Dispense: 100 g; Refill: 3  -     ciclopirox 1 % shampoo; Use on scalp at least biw  Dispense: 120 mL; Refill: 5             Follow up if symptoms worsen or fail to improve.

## 2019-09-24 ENCOUNTER — OFFICE VISIT (OUTPATIENT)
Dept: OBSTETRICS AND GYNECOLOGY | Facility: CLINIC | Age: 24
End: 2019-09-24
Payer: COMMERCIAL

## 2019-09-24 VITALS
WEIGHT: 143.5 LBS | BODY MASS INDEX: 21.75 KG/M2 | HEIGHT: 68 IN | SYSTOLIC BLOOD PRESSURE: 110 MMHG | DIASTOLIC BLOOD PRESSURE: 62 MMHG

## 2019-09-24 DIAGNOSIS — Z97.5 IUD (INTRAUTERINE DEVICE) IN PLACE: Primary | ICD-10-CM

## 2019-09-24 PROCEDURE — 99024 PR POST-OP FOLLOW-UP VISIT: ICD-10-PCS | Mod: S$GLB,,, | Performed by: OBSTETRICS & GYNECOLOGY

## 2019-09-24 PROCEDURE — 99024 POSTOP FOLLOW-UP VISIT: CPT | Mod: S$GLB,,, | Performed by: OBSTETRICS & GYNECOLOGY

## 2019-09-24 PROCEDURE — 99999 PR PBB SHADOW E&M-EST. PATIENT-LVL III: CPT | Mod: PBBFAC,,, | Performed by: OBSTETRICS & GYNECOLOGY

## 2019-09-24 PROCEDURE — 99999 PR PBB SHADOW E&M-EST. PATIENT-LVL III: ICD-10-PCS | Mod: PBBFAC,,, | Performed by: OBSTETRICS & GYNECOLOGY

## 2019-09-24 NOTE — PROGRESS NOTES
Chief Complaint   Patient presents with    IUD Check    cramps with activity/ exercise       History of Present Illness: Phylicia Klein is a 24 y.o. female that presents today 9/24/2019 for   Chief Complaint   Patient presents with    IUD Check    cramps with activity/ exercise         History reviewed. No pertinent past medical history.    Past Surgical History:   Procedure Laterality Date    ANTERIOR CRUCIATE LIGAMENT REPAIR  2010       Current Outpatient Medications   Medication Sig Dispense Refill    benzoyl peroxide (BENZEPRO) 6 % Towl Use daily in shower to trunk 1 each 5    ciclopirox 1 % shampoo Use on scalp at least biw 120 mL 5    fluconazole (DIFLUCAN) 200 MG Tab 1 po qweek 4 tablet 2    ketoconazole (EXTINA) 2 % Foam AAA trunk bid (Patient not taking: Reported on 9/24/2019) 100 g 3    ketoconazole (NIZORAL) 2 % shampoo Wash trunk with medicated shampoo at least 2x/week - let sit on affected areas at least 5 minutes prior to rinsing (Patient not taking: Reported on 9/24/2019) 120 mL 5    norethindrone-ethinyl estradiol (MICROGESTIN 1/20) 1-20 mg-mcg per tablet Take 1 tablet by mouth once daily. (Patient not taking: Reported on 9/24/2019) 30 tablet 11     No current facility-administered medications for this visit.        Review of patient's allergies indicates:   Allergen Reactions    Sulfa (sulfonamide antibiotics)      Other reaction(s): Hives    Sulfamethoxazole      Other reaction(s): Hives    Trimethoprim      Other reaction(s): Hives       Family History   Problem Relation Age of Onset    No Known Problems Mother     No Known Problems Father     No Known Problems Sister     Psoriasis Maternal Aunt     Heart disease Maternal Aunt 42        CABG    Breast cancer Maternal Aunt     Psoriasis Maternal Uncle     Psoriasis Maternal Grandfather     Heart disease Maternal Grandfather 46        cv disease    Breast cancer Maternal Aunt     Eczema Neg Hx     Melanoma Neg Hx      "Ovarian cancer Neg Hx        Social History     Tobacco Use    Smoking status: Never Smoker    Smokeless tobacco: Never Used   Substance Use Topics    Alcohol use: Yes     Comment: once or twice a week    Drug use: No       OB History    Para Term  AB Living   0 0 0 0 0 0   SAB TAB Ectopic Multiple Live Births   0 0 0 0         Review of Symptoms:  GENERAL: Denies weight gain or weight loss. Feeling well overall.   SKIN: Denies rash or lesions.   HEAD: Denies head injury or headache.   NODES: Denies enlarged lymph nodes.   CHEST: Denies chest pain or shortness of breath.   CARDIOVASCULAR: Denies palpitations or left sided chest pain.   ABDOMEN: No abdominal pain, constipation, diarrhea, nausea, vomiting or rectal bleeding.   URINARY: No frequency, dysuria, hematuria, or burning on urination.  HEMATOLOGIC: No easy bruisability or excessive bleeding.   MUSCULOSKELETAL: Denies joint pain or swelling.     /62   Ht 5' 8" (1.727 m)   Wt 65.1 kg (143 lb 8.3 oz)   Physical Exam:  APPEARANCE: Well nourished, well developed, in no acute distress.  SKIN: Normal skin turgor, no lesions.  NECK: Neck symmetric without masses   RESPIRATORY: Normal respiratory effort with no retractions or use of accessory muscles  CARDIOVASCULAR: Peripheral vascular system with no swelling no varicosities and palpation of pulses normal  LYMPHATIC: No enlargements of the lymph nodes noted in the neck, axillae, or groin  ABDOMEN: Soft. No tenderness or masses. No hepatosplenomegaly. No hernias.  PELVIC: Normal external female genitalia without lesions. Normal hair distribution. Adequate perineal body, normal urethral meatus. Urethra with no masses.  Bladder nontender. Vagina moist and well rugated without lesions or discharge. Cervix pink and without lesions. No significant cystocele or rectocele. Bimanual exam showed uterus normal size, shape, position, mobile and nontender. Adnexa without masses or tenderness. Urethra and " bladder normal. String in place  EXTREMITIES: No clubbing cyanosis or edema.    ASSESSMENT/PLAN:  IUD (intrauterine device) in place

## 2019-10-01 ENCOUNTER — PATIENT MESSAGE (OUTPATIENT)
Dept: OBSTETRICS AND GYNECOLOGY | Facility: CLINIC | Age: 24
End: 2019-10-01

## 2019-10-16 DIAGNOSIS — L73.8 PITYROSPORUM FOLLICULITIS: ICD-10-CM

## 2019-10-16 RX ORDER — FLUCONAZOLE 200 MG/1
TABLET ORAL
Qty: 4 TABLET | Refills: 2 | Status: SHIPPED | OUTPATIENT
Start: 2019-10-16 | End: 2019-10-28 | Stop reason: SDUPTHER

## 2019-10-27 ENCOUNTER — PATIENT MESSAGE (OUTPATIENT)
Dept: DERMATOLOGY | Facility: CLINIC | Age: 24
End: 2019-10-27

## 2019-10-28 DIAGNOSIS — L73.8 PITYROSPORUM FOLLICULITIS: ICD-10-CM

## 2019-10-28 RX ORDER — FLUCONAZOLE 200 MG/1
TABLET ORAL
Qty: 4 TABLET | Refills: 2 | Status: SHIPPED | OUTPATIENT
Start: 2019-10-28 | End: 2020-01-13

## 2020-01-13 ENCOUNTER — OFFICE VISIT (OUTPATIENT)
Dept: OBSTETRICS AND GYNECOLOGY | Facility: CLINIC | Age: 25
End: 2020-01-13
Payer: COMMERCIAL

## 2020-01-13 VITALS
BODY MASS INDEX: 23.36 KG/M2 | SYSTOLIC BLOOD PRESSURE: 120 MMHG | DIASTOLIC BLOOD PRESSURE: 70 MMHG | WEIGHT: 153.69 LBS

## 2020-01-13 DIAGNOSIS — Z01.419 ROUTINE GYNECOLOGICAL EXAMINATION: Primary | ICD-10-CM

## 2020-01-13 DIAGNOSIS — Z97.5 IUD (INTRAUTERINE DEVICE) IN PLACE: ICD-10-CM

## 2020-01-13 PROCEDURE — 99999 PR PBB SHADOW E&M-EST. PATIENT-LVL III: ICD-10-PCS | Mod: PBBFAC,,, | Performed by: OBSTETRICS & GYNECOLOGY

## 2020-01-13 PROCEDURE — 88175 CYTOPATH C/V AUTO FLUID REDO: CPT

## 2020-01-13 PROCEDURE — 99395 PREV VISIT EST AGE 18-39: CPT | Mod: S$GLB,,, | Performed by: OBSTETRICS & GYNECOLOGY

## 2020-01-13 PROCEDURE — 99395 PR PREVENTIVE VISIT,EST,18-39: ICD-10-PCS | Mod: S$GLB,,, | Performed by: OBSTETRICS & GYNECOLOGY

## 2020-01-13 PROCEDURE — 99999 PR PBB SHADOW E&M-EST. PATIENT-LVL III: CPT | Mod: PBBFAC,,, | Performed by: OBSTETRICS & GYNECOLOGY

## 2020-01-13 NOTE — PROGRESS NOTES
Chief Complaint   Patient presents with    Well Woman    IUD Check       History of Present Illness: Phylicia Klein is a 24 y.o. female that presents today 1/13/2020 for well gyn visit.    History reviewed. No pertinent past medical history.    Past Surgical History:   Procedure Laterality Date    ANTERIOR CRUCIATE LIGAMENT REPAIR  2010       Current Outpatient Medications   Medication Sig Dispense Refill    benzoyl peroxide (BENZEPRO) 6 % Towl Use daily in shower to trunk 1 each 5    ciclopirox 1 % shampoo Use on scalp at least biw 120 mL 5    ketoconazole (EXTINA) 2 % Foam AAA trunk bid 100 g 3    ketoconazole (NIZORAL) 2 % shampoo Wash trunk with medicated shampoo at least 2x/week - let sit on affected areas at least 5 minutes prior to rinsing 120 mL 5    levonorgestrel (KYLEENA) 17.5 mcg/24 hrs (5 yrs) 19.5 mg IUD 1 each by Intrauterine route once.       No current facility-administered medications for this visit.        Review of patient's allergies indicates:   Allergen Reactions    Sulfa (sulfonamide antibiotics)      Other reaction(s): Hives    Sulfamethoxazole      Other reaction(s): Hives    Trimethoprim      Other reaction(s): Hives       Family History   Problem Relation Age of Onset    No Known Problems Mother     No Known Problems Father     No Known Problems Sister     Psoriasis Maternal Aunt     Heart disease Maternal Aunt 42        CABG    Breast cancer Maternal Aunt     Psoriasis Maternal Uncle     Psoriasis Maternal Grandfather     Heart disease Maternal Grandfather 46        cv disease    Breast cancer Maternal Aunt     Eczema Neg Hx     Melanoma Neg Hx     Ovarian cancer Neg Hx        Social History     Socioeconomic History    Marital status: Single     Spouse name: Not on file    Number of children: Not on file    Years of education: Not on file    Highest education level: Not on file   Occupational History    Occupation: Student   Social Needs    Financial  resource strain: Not on file    Food insecurity:     Worry: Not on file     Inability: Not on file    Transportation needs:     Medical: Not on file     Non-medical: Not on file   Tobacco Use    Smoking status: Never Smoker    Smokeless tobacco: Never Used   Substance and Sexual Activity    Alcohol use: Yes     Comment: once or twice a week    Drug use: No    Sexual activity: Not Currently     Birth control/protection: None   Lifestyle    Physical activity:     Days per week: Not on file     Minutes per session: Not on file    Stress: Not on file   Relationships    Social connections:     Talks on phone: Not on file     Gets together: Not on file     Attends Religion service: Not on file     Active member of club or organization: Not on file     Attends meetings of clubs or organizations: Not on file     Relationship status: Not on file   Other Topics Concern    Are you pregnant or think you may be? No    Breast-feeding No   Social History Narrative    Not on file       OB History    Para Term  AB Living   0 0 0 0 0 0   SAB TAB Ectopic Multiple Live Births   0 0 0 0         Review of Symptoms:  GENERAL: Denies weight gain or weight loss. Feeling well overall.   SKIN: Denies rash or lesions.   HEAD: Denies head injury or headache.   NODES: Denies enlarged lymph nodes.   CHEST: Denies chest pain or shortness of breath.   CARDIOVASCULAR: Denies palpitations or left sided chest pain.   ABDOMEN: No abdominal pain, constipation, diarrhea, nausea, vomiting or rectal bleeding.   URINARY: No frequency, dysuria, hematuria, or burning on urination.  HEMATOLOGIC: No easy bruisability or excessive bleeding.   MUSCULOSKELETAL: Denies joint pain or swelling.     /70   Wt 69.7 kg (153 lb 10.6 oz)   Physical Exam:  APPEARANCE: Well nourished, well developed, in no acute distress.  SKIN: Normal skin turgor, no lesions.  NECK: Neck symmetric without masses   RESPIRATORY: Normal respiratory effort  with no retractions or use of accessory muscles  CARDIOVASCULAR: Peripheral vascular system with no swelling no varicosities and palpation of pulses normal  LYMPHATIC: No enlargements of the lymph nodes noted in the neck, axillae, or groin  ABDOMEN: Soft. No tenderness or masses. No hepatosplenomegaly. No hernias.  BREASTS: Symmetrical, no skin changes or visible lesions. No palpable masses, nipple discharge or adenopathy bilaterally.  PELVIC: Normal external female genitalia without lesions. Normal hair distribution. Adequate perineal body, normal urethral meatus. Urethra with no masses.  Bladder nontender. Vagina moist and well rugated without lesions or discharge. Cervix pink and without lesions. No significant cystocele or rectocele. Bimanual exam showed uterus normal size, shape, position, mobile and nontender. Adnexa without masses or tenderness. Urethra and bladder normal.     String inplace       EXTREMITIES: No clubbing cyanosis or edema.    ASSESSMENT/PLAN:  Routine gynecological examination  -     Liquid-Based Pap Smear, Screening    IUD (intrauterine device) in place          Patient was counseled today on Pelvic exams and Pap Smear guidelines.   We discussed STD screening if at high risk for a STD.  We discussed recommendation for breast cancer screening with mammogram every other year after the age of 40 and annually after the age of 50.    We discussed colon cancer screening when indicated.   Osteoporosis screening discussed when indicated.   She was advised to see her primary care physician for all other health maintenance.     FOLLOW-UP with me for next routine visit.

## 2020-01-28 LAB
FINAL PATHOLOGIC DIAGNOSIS: NORMAL
Lab: NORMAL

## 2020-03-24 ENCOUNTER — PATIENT MESSAGE (OUTPATIENT)
Dept: DERMATOLOGY | Facility: CLINIC | Age: 25
End: 2020-03-24

## 2020-03-30 RX ORDER — HALOBETASOL PROPIONATE 0.5 MG/G
AEROSOL, FOAM TOPICAL
Qty: 50 G | Refills: 2 | Status: SHIPPED | OUTPATIENT
Start: 2020-03-30 | End: 2020-09-28 | Stop reason: SDUPTHER

## 2020-07-27 ENCOUNTER — OFFICE VISIT (OUTPATIENT)
Dept: FAMILY MEDICINE | Facility: CLINIC | Age: 25
End: 2020-07-27
Payer: COMMERCIAL

## 2020-07-27 VITALS
TEMPERATURE: 97 F | DIASTOLIC BLOOD PRESSURE: 86 MMHG | BODY MASS INDEX: 24.46 KG/M2 | HEART RATE: 58 BPM | HEIGHT: 68 IN | OXYGEN SATURATION: 98 % | SYSTOLIC BLOOD PRESSURE: 112 MMHG | WEIGHT: 161.38 LBS

## 2020-07-27 DIAGNOSIS — F41.1 GAD (GENERALIZED ANXIETY DISORDER): Primary | Chronic | ICD-10-CM

## 2020-07-27 PROCEDURE — 99999 PR PBB SHADOW E&M-EST. PATIENT-LVL IV: CPT | Mod: PBBFAC,,, | Performed by: PHYSICIAN ASSISTANT

## 2020-07-27 PROCEDURE — 99213 OFFICE O/P EST LOW 20 MIN: CPT | Mod: S$GLB,,, | Performed by: PHYSICIAN ASSISTANT

## 2020-07-27 PROCEDURE — 3008F PR BODY MASS INDEX (BMI) DOCUMENTED: ICD-10-PCS | Mod: CPTII,S$GLB,, | Performed by: PHYSICIAN ASSISTANT

## 2020-07-27 PROCEDURE — 99999 PR PBB SHADOW E&M-EST. PATIENT-LVL IV: ICD-10-PCS | Mod: PBBFAC,,, | Performed by: PHYSICIAN ASSISTANT

## 2020-07-27 PROCEDURE — 99213 PR OFFICE/OUTPT VISIT, EST, LEVL III, 20-29 MIN: ICD-10-PCS | Mod: S$GLB,,, | Performed by: PHYSICIAN ASSISTANT

## 2020-07-27 PROCEDURE — 3008F BODY MASS INDEX DOCD: CPT | Mod: CPTII,S$GLB,, | Performed by: PHYSICIAN ASSISTANT

## 2020-07-27 RX ORDER — BUSPIRONE HYDROCHLORIDE 5 MG/1
5 TABLET ORAL 2 TIMES DAILY
Qty: 60 TABLET | Refills: 0 | Status: SHIPPED | OUTPATIENT
Start: 2020-07-27 | End: 2020-08-18

## 2020-07-27 NOTE — PATIENT INSTRUCTIONS
Take Buspar once a day and add the second pill for added anxiety.  Please let us know if it is helping in two weeks.    Thanks for seeing me,  Lisa Vegas PA-C

## 2020-07-27 NOTE — PROGRESS NOTES
"Subjective:      Patient ID: Phylicia Klein is a 24 y.o. female.    Chief Complaint: Anxiety  Patient is new to me.    HPI   Patient has been treated for anxiety in the past with Lexapro, but did not feel like it helped at that time.  She reports new anxiety currently.  She has been sleeping 6 hours a night.  She has been nervous that something bad is going to happen in the future.    Review of Systems   Constitutional: Negative for chills and fever.   Respiratory: Negative for shortness of breath.    Cardiovascular: Negative for chest pain.   Musculoskeletal: Negative for back pain and neck pain.   Neurological: Negative for dizziness, light-headedness and headaches.   Psychiatric/Behavioral: Positive for agitation and sleep disturbance. Negative for suicidal ideas. The patient is nervous/anxious.        Objective:   /86 (BP Location: Left arm, Patient Position: Sitting)   Pulse (!) 58   Temp 97.3 °F (36.3 °C) (Oral)   Ht 5' 8" (1.727 m)   Wt 73.2 kg (161 lb 6 oz)   LMP 07/13/2020 (Approximate)   SpO2 98%   BMI 24.54 kg/m²      Physical Exam  Vitals signs reviewed.   Constitutional:       General: She is not in acute distress.     Appearance: Normal appearance. She is well-developed and well-groomed.   HENT:      Head: Normocephalic and atraumatic.      Right Ear: Hearing and external ear normal.      Left Ear: Hearing and external ear normal.   Eyes:      General: Lids are normal.      Conjunctiva/sclera: Conjunctivae normal.   Neck:      Musculoskeletal: Normal range of motion.      Trachea: Phonation normal.   Cardiovascular:      Rate and Rhythm: Normal rate and regular rhythm.      Heart sounds: Normal heart sounds. No murmur. No friction rub. No gallop.    Pulmonary:      Effort: Pulmonary effort is normal. No respiratory distress.      Breath sounds: Normal breath sounds. No decreased breath sounds, wheezing, rhonchi or rales.   Musculoskeletal: Normal range of motion.   Skin:     General: " Skin is warm and dry.      Findings: No rash.   Neurological:      General: No focal deficit present.      Mental Status: She is alert and oriented to person, place, and time.   Psychiatric:         Attention and Perception: Attention normal.         Mood and Affect: Mood is anxious.         Speech: Speech normal.         Behavior: Behavior is cooperative.         Thought Content: Thought content is paranoid.         Cognition and Memory: Cognition normal.        Assessment:      1. ANABEL (generalized anxiety disorder)       Plan:   1. ANABEL (generalized anxiety disorder)  Take Buspar once a day and add the second pill for added anxiety.  Please let us know if it is helping in two weeks.  - Ambulatory referral/consult to Psychology; Future  - busPIRone (BUSPAR) 5 MG Tab; Take 1 tablet (5 mg total) by mouth 2 (two) times daily.  Dispense: 60 tablet; Refill: 0    Follow up in one month with me.  Patient agreed with plan and expressed understanding.    Thank you for allowing me to serve you,

## 2020-08-12 ENCOUNTER — TELEPHONE (OUTPATIENT)
Dept: FAMILY MEDICINE | Facility: CLINIC | Age: 25
End: 2020-08-12

## 2020-08-12 NOTE — TELEPHONE ENCOUNTER
Spoke to pt about taking buspar for anxiety, pt stated that it has kind of been helping and that she is going to start taking 2 a day. Pt stated her anxiety is not as severe as before.

## 2020-08-12 NOTE — TELEPHONE ENCOUNTER
----- Message from Lisa Vegas PA-C sent at 8/4/2020  9:20 AM CDT -----  Please see if the Buspar is working for her anxiety.  Thanks!

## 2020-08-19 ENCOUNTER — TELEPHONE (OUTPATIENT)
Dept: PSYCHIATRY | Facility: CLINIC | Age: 25
End: 2020-08-19

## 2020-08-19 NOTE — TELEPHONE ENCOUNTER
Called patient and offered tor reschedule her over the phone but she was driving and did not have her calender on hand so she will schedule through the portal, per pt/ Pt aware and okay with next availability around 9/1-9/3

## 2020-08-31 ENCOUNTER — OFFICE VISIT (OUTPATIENT)
Dept: FAMILY MEDICINE | Facility: CLINIC | Age: 25
End: 2020-08-31
Payer: COMMERCIAL

## 2020-08-31 VITALS
BODY MASS INDEX: 24.64 KG/M2 | DIASTOLIC BLOOD PRESSURE: 78 MMHG | WEIGHT: 162.06 LBS | OXYGEN SATURATION: 97 % | SYSTOLIC BLOOD PRESSURE: 112 MMHG | HEART RATE: 60 BPM | TEMPERATURE: 98 F

## 2020-08-31 DIAGNOSIS — F41.1 GAD (GENERALIZED ANXIETY DISORDER): Primary | Chronic | ICD-10-CM

## 2020-08-31 PROCEDURE — 3008F BODY MASS INDEX DOCD: CPT | Mod: CPTII,S$GLB,, | Performed by: PHYSICIAN ASSISTANT

## 2020-08-31 PROCEDURE — 3008F PR BODY MASS INDEX (BMI) DOCUMENTED: ICD-10-PCS | Mod: CPTII,S$GLB,, | Performed by: PHYSICIAN ASSISTANT

## 2020-08-31 PROCEDURE — 99999 PR PBB SHADOW E&M-EST. PATIENT-LVL III: CPT | Mod: PBBFAC,,, | Performed by: PHYSICIAN ASSISTANT

## 2020-08-31 PROCEDURE — 99999 PR PBB SHADOW E&M-EST. PATIENT-LVL III: ICD-10-PCS | Mod: PBBFAC,,, | Performed by: PHYSICIAN ASSISTANT

## 2020-08-31 PROCEDURE — 99213 PR OFFICE/OUTPT VISIT, EST, LEVL III, 20-29 MIN: ICD-10-PCS | Mod: S$GLB,,, | Performed by: PHYSICIAN ASSISTANT

## 2020-08-31 PROCEDURE — 99213 OFFICE O/P EST LOW 20 MIN: CPT | Mod: S$GLB,,, | Performed by: PHYSICIAN ASSISTANT

## 2020-08-31 NOTE — PROGRESS NOTES
Primary Care Behavioral Health: Initial/Virtial Visit  Patient Name:  Phylicia Klein  Date:  09/01/20  Site:  [facility]  Referral source:  Lisa Vegas PA-C    The patient location is:  [address] [place] [address]  The patient phone number is: [phone]  Visit type: Virtual visit with synchronous audio and video  Each patient to whom he or she provides medical services by telemedicine is:  (1) informed of the relationship between the physician and patient and the respective role of any other health care provider with respect to management of the patient; and (2) notified that he or she may decline to receive medical services by telemedicine and may withdraw from such care at any time.    Chief complaint/reason for encounter:   ANABEL (generalized anxiety disorder)    History of present illness:  Ms. Phylicia Klein  is a 25 y.o. White  female referred Lisa Vegas PA-C by for symptoms of ANABEL (generalized anxiety disorder). Patient was seen, examined and chart was reviewed.  Patient notes a long standing hx of anxiety and thinking of herself as a 'nervous' person.  Patient notes throughout the last several months she has experienced increase in worry.  Patient notes she is  and has a strong social support group.  Patient notes when she meets new people she struggles with feeling overwhelmed and increased worry about their impression of her.  Patient notes she has been  almost 1 year.  Patient notes she has family who reside close to her and are helpful and supportive.    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  Patient notes the dogs she works with often make noise in the night and wake her up.  Patient notes she experiences sleep difficulty; waking in the night with concern that a dog is barking or they barked and she may have missed.      Patient notes she doesn't necessarily enjoy exercise but does exercise  intermittently.     No past medical history on file.       Current Outpatient Medications:     busPIRone (BUSPAR) 5 MG Tab, TAKE 1 TABLET BY MOUTH TWICE A DAY, Disp: 60 tablet, Rfl: 0    halobetasol propionate (LEXETTE) 0.05 % Foam, aaa qd. Avoid use onface and groin.  Not more than 2 weeks straight in same location, Disp: 50 g, Rfl: 2    ketoconazole (EXTINA) 2 % Foam, AAA trunk bid, Disp: 100 g, Rfl: 3    levonorgestrel (KYLEENA) 17.5 mcg/24 hrs (5 yrs) 19.5 mg IUD, 1 each by Intrauterine route once., Disp: , Rfl:     Psychiatric history:  Previous diagnosis: Anxiety  Previous medications: Buspar; Lexapro by hx  Previous hospitalizations: Patient denies.  History of outpatient treatment: Patient denies.  Previous suicide attempt: Patient denies.  Family history of psychiatric illness: Grandfather: Bipolar Disorder; Mother: Anxiety; Maternal Aunts: Anxiety; Maternal Cousins: Anxiety    Current and past substance use:  Alcohol:  1-2 drinks 2x/week and 5-6 drinks 1x/week;  Denied history of abuse or dependency.  Drugs:  Denied current use.  Denied history of abuse or dependency.  Tobacco:  Denied current use.  Caffeine:  1-2 cups of coffee per day and 1 soda 3x/week    Psychiatric symptoms:  Depression:  Patient notes she experiences symptoms of decreased motivation, decreased desire to engage in tasks.  She denied suicidal ideation.  Shanell/Hypomania:  Denied.  She denied periods of elevated mood or abnormally increased energy or goal-directed activity.  Anxiety:  Patient endorses symptoms of nervousness, restlessness, increased worry.  Thoughts:  Denied delusions, hallucinations.  Suicidal thoughts/behaviors: Patient denied suicidal and homicidal ideation, plan and intent.  Patient noted agreement to call 911/and or present to the ED if she experienced suicidal or homicidal ideation, plan or intent.    Self-injury:  Denied.  Sleep: Patient notes she experiences difficulty falling asleep and staying asleep at  times.  Patient notes she gets into bed around 10:30pm, falls asleep around 11:30pm (playing on her phone for 1 hour), waking in the middle of the night at times around 4-6am feeling restless.  Patient notes she gets out of bed around 7am.  Trauma: Patient denies.    Mental Status Exam:  General appearance:  appears stated age, neatly dressed, well groomed  Speech:  Clear and intelligible, normal rate, normal tone, normal pitch, normal volume  Level of cooperation:  cooperative  Thought processes:  Linear, logical, goal-directed  Mood:  Anxious  Thought content:  Relevant and appropriate, no illusions, no visual hallucinations, no auditory hallucinations, no delusions, no active or passive homicidal thoughts, no active or passive suicidal ideation, no obsessions, no compulsions, no violence  Affect:  Nervous, restless  Orientation:  oriented to person, place, situation and date  Memory/Attention/Concentration:  No gross cognitive deficits made evident during conversation.  Judgment and insight: fair  Language:  Intact    PHQ9 9/1/2020   Total Score 10     GAD7 9/1/2020   1. Feeling nervous, anxious, or on edge? 3   2. Not being able to stop or control worrying? 3   3. Worrying too much about different things? 2   4. Trouble relaxing? 0   5. Being so restless that it is hard to sit still? 0   6. Becoming easily annoyed or irritable? 1   7. Feeling afraid as if something awful might happen? 2   ANABEL-7 Score 11     Impression: Patient presents today endorsing symptoms of anxiety for most of her life.  Patient notes symptoms have been fairly consistent with periods of exacerbation during periods of increased stress.  Patient also notes some symptoms of depression with particular difficulty with low motivation.  Of note, patient also endorses sleep difficulty with primarily appears related to anxiety and increased worry.     Diagnosis:  1. Depression, unspecified depression type     2. ANABEL (generalized anxiety disorder)   Ambulatory referral/consult to Psychology     Plan:    1.  Patient provided psychoeducation on anxiety and depression.  2.  Setting a daily schedule and daily routine with small attainable daily goals discussed.  3.  Patient requested to keep a sleep log and bring to upcoming appointment.  Patient provided psychoeducation on sleep hygiene.  4.  Challenging anxious thinking and thinking errors discussed.  5.  Patient to follow-up in 2 weeks.    Length of Session:  44 minutes

## 2020-08-31 NOTE — PROGRESS NOTES
Subjective:      Patient ID: Phylicia Klein is a 25 y.o. female.    Chief Complaint: Follow-up    HPI   Patient saw me 7/27/2020 with anxiety and treated with Buspar 5mg BID.  Patient has been taking Buspar twice a day for 10 days.  She has been hearing her dogs barking in the middle of the night when they are not with added paranoia around her dogs.    Review of Systems   Constitutional: Negative for chills and fever.   Respiratory: Negative for shortness of breath.    Cardiovascular: Negative for chest pain.   Gastrointestinal: Negative for abdominal pain, constipation, diarrhea, nausea and vomiting.   Psychiatric/Behavioral: Positive for agitation, hallucinations (auditory-dogs barking when they are not) and sleep disturbance. The patient is nervous/anxious.        Objective:   /78 (BP Location: Left arm, Patient Position: Sitting)   Pulse 60   Temp 98 °F (36.7 °C)   Wt 73.5 kg (162 lb 0.6 oz)   SpO2 97%   BMI 24.64 kg/m²      Physical Exam  Vitals signs reviewed.   Constitutional:       General: She is not in acute distress.     Appearance: Normal appearance. She is well-developed and well-groomed.   HENT:      Head: Normocephalic and atraumatic.      Right Ear: Hearing and external ear normal.      Left Ear: Hearing and external ear normal.   Eyes:      General: Lids are normal.      Conjunctiva/sclera: Conjunctivae normal.   Neck:      Musculoskeletal: Normal range of motion.      Trachea: Phonation normal.   Cardiovascular:      Rate and Rhythm: Normal rate and regular rhythm.      Heart sounds: Normal heart sounds. No murmur. No friction rub. No gallop.    Pulmonary:      Effort: Pulmonary effort is normal. No respiratory distress.      Breath sounds: Normal breath sounds. No decreased breath sounds, wheezing, rhonchi or rales.   Musculoskeletal: Normal range of motion.   Skin:     General: Skin is warm and dry.      Findings: No rash.   Neurological:      General: No focal deficit present.       Mental Status: She is alert and oriented to person, place, and time.   Psychiatric:         Attention and Perception: Attention normal.         Mood and Affect: Mood is anxious.         Speech: Speech normal.         Behavior: Behavior normal. Behavior is cooperative.         Cognition and Memory: Cognition normal.         Judgment: Judgment normal.        Assessment:      1. ANABEL (generalized anxiety disorder)       Plan:   1. ANABEL (generalized anxiety disorder)  Increase Buspar 5mg to three times a day.    Follow up in one month with me.  Patient agreed with plan and expressed understanding.    Thank you for allowing me to serve you,

## 2020-09-01 ENCOUNTER — OFFICE VISIT (OUTPATIENT)
Dept: PSYCHIATRY | Facility: CLINIC | Age: 25
End: 2020-09-01
Payer: COMMERCIAL

## 2020-09-01 DIAGNOSIS — F41.1 GAD (GENERALIZED ANXIETY DISORDER): Chronic | ICD-10-CM

## 2020-09-01 DIAGNOSIS — F32.A DEPRESSION, UNSPECIFIED DEPRESSION TYPE: Primary | ICD-10-CM

## 2020-09-01 PROCEDURE — 90791 PR PSYCHIATRIC DIAGNOSTIC EVALUATION: ICD-10-PCS | Mod: 95,,, | Performed by: PSYCHOLOGIST

## 2020-09-01 PROCEDURE — 90791 PSYCH DIAGNOSTIC EVALUATION: CPT | Mod: 95,,, | Performed by: PSYCHOLOGIST

## 2020-09-14 ENCOUNTER — OFFICE VISIT (OUTPATIENT)
Dept: PSYCHIATRY | Facility: CLINIC | Age: 25
End: 2020-09-14
Payer: COMMERCIAL

## 2020-09-14 DIAGNOSIS — R69 DIAGNOSIS DEFERRED: Primary | ICD-10-CM

## 2020-09-14 PROCEDURE — 99499 NO LOS: ICD-10-PCS | Mod: 95,,, | Performed by: PSYCHOLOGIST

## 2020-09-14 PROCEDURE — 99499 UNLISTED E&M SERVICE: CPT | Mod: 95,,, | Performed by: PSYCHOLOGIST

## 2020-09-14 NOTE — PROGRESS NOTES
Patient did not login for visit and was attempted to be contacted by staff however did not answer.  Patient did login for visit 40 minutes late and then left visit.  Staff was advised to contact patient and offer her to reschedule for day after this on 9/15/2020.

## 2020-09-15 ENCOUNTER — OFFICE VISIT (OUTPATIENT)
Dept: PSYCHIATRY | Facility: CLINIC | Age: 25
End: 2020-09-15
Payer: COMMERCIAL

## 2020-09-15 DIAGNOSIS — F41.1 GAD (GENERALIZED ANXIETY DISORDER): Primary | ICD-10-CM

## 2020-09-15 DIAGNOSIS — F32.A DEPRESSION, UNSPECIFIED DEPRESSION TYPE: ICD-10-CM

## 2020-09-15 PROCEDURE — 90834 PR PSYCHOTHERAPY W/PATIENT, 45 MIN: ICD-10-PCS | Mod: 95,,, | Performed by: PSYCHOLOGIST

## 2020-09-15 PROCEDURE — 90834 PSYTX W PT 45 MINUTES: CPT | Mod: 95,,, | Performed by: PSYCHOLOGIST

## 2020-09-15 NOTE — PROGRESS NOTES
Primary Care Behavioral Health: Follow Up/Virtial Visit  Patient Name:  Phylicia Klein  Date:  09/15/2020   Site:  [facility]  Referral source:  Lisa Vegas PA-C     The patient location is:  [address] [place] [address]  The patient phone number is: [phone]  Visit type: Virtual visit with synchronous audio and video  Each patient to whom he or she provides medical services by telemedicine is:  (1) informed of the relationship between the physician and patient and the respective role of any other health care provider with respect to management of the patient; and (2) notified that he or she may decline to receive medical services by telemedicine and may withdraw from such care at any time.     Chief complaint/reason for encounter:   ANABEL (generalized anxiety disorder)     History of present illness:  Ms. Phylicia Klein  is a 25 y.o. White  female referred Lisa Vegas PA-C by for symptoms of ANABEL (generalized anxiety disorder). Patient was seen, examined and chart was reviewed.  Patient presents today noting she has continued to experience symptoms of anxiety. Patient notes she has been working to challenge anxious thinking and speaking friends/family about her anxious thinking.  Patient notes she has experienced some improvement in motivation and keeping to a daily routine.      No past medical history on file.       Current Outpatient Medications:     busPIRone (BUSPAR) 5 MG Tab, TAKE 1 TABLET BY MOUTH TWICE A DAY, Disp: 60 tablet, Rfl: 0    halobetasol propionate (LEXETTE) 0.05 % Foam, aaa qd. Avoid use onface and groin.  Not more than 2 weeks straight in same location, Disp: 50 g, Rfl: 2    ketoconazole (EXTINA) 2 % Foam, AAA trunk bid, Disp: 100 g, Rfl: 3    levonorgestrel (KYLEENA) 17.5 mcg/24 hrs (5 yrs) 19.5 mg IUD, 1 each by Intrauterine route once., Disp: , Rfl:     Psychiatric symptoms:  Depression:  Patient notes she experiences symptoms of decreased motivation, decreased desire  to engage in tasks, depressed mood at times and sleep difficulty.  She denied suicidal ideation.  Shanell/Hypomania:  Denied.  She denied periods of elevated mood or abnormally increased energy or goal-directed activity.  Anxiety:  Patient endorses symptoms of nervousness and worry.  Thoughts:  Denied delusions, hallucinations.  Suicidal thoughts/behaviors: Patient denied suicidal and homicidal ideation, plan and intent.  Patient noted agreement to call 911/and or present to the ED if she experienced suicidal or homicidal ideation, plan or intent.    Self-injury:  Denied.  Sleep: Patient notes she has been laying down at 10:30pm and turns TV and phone around 11pm and then falls asleep around 11:30pm, waking in the middle of the night generally on one occasion 3-4am for 15 minutes. Patient notes she gets out of bed around 7am.   Trauma: Patient denies.     Mental Status Exam:  General appearance:  appears stated age, neatly dressed, well groomed  Speech:  Clear and intelligible, normal rate, normal tone, normal pitch, normal volume  Level of cooperation:  cooperative  Thought processes:  Linear, logical, goal-directed  Mood:  Anxious  Thought content:  Relevant and appropriate, no illusions, no visual hallucinations, no auditory hallucinations, no delusions, no active or passive homicidal thoughts, no active or passive suicidal ideation, no obsessions, no compulsions, no violence  Affect:  Nervous, restless  Orientation:  oriented to person, place, situation and date  Memory/Attention/Concentration:  No gross cognitive deficits made evident during conversation.  Judgment and insight: fair  Language:  Intact     PHQ9 9/15/2020   Total Score 11     GAD7 9/15/2020 9/1/2020   1. Feeling nervous, anxious, or on edge? 3 3   2. Not being able to stop or control worrying? 2 3   3. Worrying too much about different things? 2 2   4. Trouble relaxing? 2 0   5. Being so restless that it is hard to sit still? 0 0   6. Becoming  easily annoyed or irritable? 2 1   7. Feeling afraid as if something awful might happen? 1 2   ANABEL-7 Score 12 11      Impression: Patient presents today noting some ongoing symptoms of depression and anxiety.  Patient does appear to have developed improved insight into symptoms and has been actively engaged in implementing strategies for management of such.     Diagnosis:  1. Depression, unspecified depression type      2. ANABEL (generalized anxiety disorder)  Ambulatory referral/consult to Psychology      Plan:    1.  Patient provided psychoeducation on anxiety and depression.  2.  Setting a daily schedule and daily routine with small attainable daily goals discussed.  3.  Challenging anxious thinking and thinking errors discussed.  4.  Sleep hygiene discussed.  Patient notes ongoing sleep difficulty however does not her 's work scheduled is changing in the near future and she is hopeful this will assist with their sleep schedule.    5.  Patient to follow-up in 3 weeks.     Length of Session:  45 minutes

## 2020-09-21 ENCOUNTER — PATIENT OUTREACH (OUTPATIENT)
Dept: ADMINISTRATIVE | Facility: OTHER | Age: 25
End: 2020-09-21

## 2020-09-21 NOTE — PROGRESS NOTES
Health Maintenance Due   Topic Date Due    TETANUS VACCINE  09/13/2017    Influenza Vaccine (1) 08/01/2020     Updates were requested from care everywhere.  Chart was reviewed for overdue Proactive [facility] Encounters ([facility]) topics (CRS, Breast Cancer Screening, Eye exam)  Health Maintenance has been updated.  LINKS immunization registry triggered.  Immunizations were reconciled.

## 2020-09-22 ENCOUNTER — OFFICE VISIT (OUTPATIENT)
Dept: OBSTETRICS AND GYNECOLOGY | Facility: CLINIC | Age: 25
End: 2020-09-22
Payer: COMMERCIAL

## 2020-09-22 VITALS
BODY MASS INDEX: 23.82 KG/M2 | DIASTOLIC BLOOD PRESSURE: 66 MMHG | HEIGHT: 68 IN | SYSTOLIC BLOOD PRESSURE: 102 MMHG | WEIGHT: 157.19 LBS

## 2020-09-22 DIAGNOSIS — N94.6 DYSMENORRHEA: Primary | ICD-10-CM

## 2020-09-22 DIAGNOSIS — Z97.5 IUD (INTRAUTERINE DEVICE) IN PLACE: ICD-10-CM

## 2020-09-22 PROCEDURE — 3008F PR BODY MASS INDEX (BMI) DOCUMENTED: ICD-10-PCS | Mod: CPTII,S$GLB,, | Performed by: OBSTETRICS & GYNECOLOGY

## 2020-09-22 PROCEDURE — 3008F BODY MASS INDEX DOCD: CPT | Mod: CPTII,S$GLB,, | Performed by: OBSTETRICS & GYNECOLOGY

## 2020-09-22 PROCEDURE — 99999 PR PBB SHADOW E&M-EST. PATIENT-LVL III: ICD-10-PCS | Mod: PBBFAC,,, | Performed by: OBSTETRICS & GYNECOLOGY

## 2020-09-22 PROCEDURE — 99213 OFFICE O/P EST LOW 20 MIN: CPT | Mod: S$GLB,,, | Performed by: OBSTETRICS & GYNECOLOGY

## 2020-09-22 PROCEDURE — 99213 PR OFFICE/OUTPT VISIT, EST, LEVL III, 20-29 MIN: ICD-10-PCS | Mod: S$GLB,,, | Performed by: OBSTETRICS & GYNECOLOGY

## 2020-09-22 PROCEDURE — 99999 PR PBB SHADOW E&M-EST. PATIENT-LVL III: CPT | Mod: PBBFAC,,, | Performed by: OBSTETRICS & GYNECOLOGY

## 2020-09-22 NOTE — PROGRESS NOTES
Chief Complaint   Patient presents with    IUD Check    Dysmenorrhea    btb       History of Present Illness: Phylicia Chamberlain is a 25 y.o. female that presents today 9/22/2020 for   Chief Complaint   Patient presents with    IUD Check    Dysmenorrhea    btb   she reports on her cycle she has bad cramping with nausea.  She reports severe pain with her cycle for 3 days every month.  She says she has this severe cramping episodes for 30 secends.       History reviewed. No pertinent past medical history.    Past Surgical History:   Procedure Laterality Date    ANTERIOR CRUCIATE LIGAMENT REPAIR  2010       Current Outpatient Medications   Medication Sig Dispense Refill    busPIRone (BUSPAR) 5 MG Tab TAKE 1 TABLET BY MOUTH TWICE A DAY 60 tablet 0    halobetasol propionate (LEXETTE) 0.05 % Foam aaa qd. Avoid use onface and groin.  Not more than 2 weeks straight in same location 50 g 2    levonorgestrel (KYLEENA) 17.5 mcg/24 hrs (5 yrs) 19.5 mg IUD 1 each by Intrauterine route once.      ketoconazole (EXTINA) 2 % Foam AAA trunk bid 100 g 3     No current facility-administered medications for this visit.        Review of patient's allergies indicates:   Allergen Reactions    Sulfa (sulfonamide antibiotics)      Other reaction(s): Hives    Sulfamethoxazole      Other reaction(s): Hives    Trimethoprim      Other reaction(s): Hives       Family History   Problem Relation Age of Onset    No Known Problems Mother     No Known Problems Father     No Known Problems Sister     Psoriasis Maternal Aunt     Heart disease Maternal Aunt 42        CABG    Breast cancer Maternal Aunt     Psoriasis Maternal Uncle     Psoriasis Maternal Grandfather     Heart disease Maternal Grandfather 46        cv disease    Breast cancer Maternal Aunt     Eczema Neg Hx     Melanoma Neg Hx     Ovarian cancer Neg Hx        Social History     Tobacco Use    Smoking status: Never Smoker    Smokeless tobacco: Never  "Used   Substance Use Topics    Alcohol use: Yes     Comment: once or twice a week    Drug use: No       OB History    Para Term  AB Living   0 0 0 0 0 0   SAB TAB Ectopic Multiple Live Births   0 0 0 0         Review of Symptoms:  GENERAL: Denies weight gain or weight loss. Feeling well overall.   SKIN: Denies rash or lesions.   HEAD: Denies head injury or headache.   NODES: Denies enlarged lymph nodes.   CHEST: Denies chest pain or shortness of breath.   CARDIOVASCULAR: Denies palpitations or left sided chest pain.   ABDOMEN: No abdominal pain, constipation, diarrhea, nausea, vomiting or rectal bleeding.   URINARY: No frequency, dysuria, hematuria, or burning on urination.  HEMATOLOGIC: No easy bruisability or excessive bleeding.   MUSCULOSKELETAL: Denies joint pain or swelling.     /66   Ht 5' 8" (1.727 m)   Wt 71.3 kg (157 lb 3 oz)   LMP 2020   Physical Exam:  APPEARANCE: Well nourished, well developed, in no acute distress.  SKIN: Normal skin turgor, no lesions.  NECK: Neck symmetric without masses   RESPIRATORY: Normal respiratory effort with no retractions or use of accessory muscles  CARDIOVASCULAR: Peripheral vascular system with no swelling no varicosities and palpation of pulses normal  LYMPHATIC: No enlargements of the lymph nodes noted in the neck, axillae, or groin  ABDOMEN: Soft. No tenderness or masses. No hepatosplenomegaly. No hernias.  PELVIC: Normal external female genitalia without lesions. Normal hair distribution. Adequate perineal body, normal urethral meatus. Urethra with no masses.  Bladder nontender. Vagina moist and well rugated without lesions or discharge. Cervix pink and without lesions. No significant cystocele or rectocele. Bimanual exam showed uterus normal size, shape, position, mobile and nontender. Adnexa without masses or tenderness. Urethra and bladder normal.  +++ String in place   EXTREMITIES: No clubbing cyanosis or " edema.    ASSESSMENT/PLAN:  Dysmenorrhea  -     US Pelvis Comp with Transvag NON-OB (xpd; Future; Expected date: 09/22/2020    IUD (intrauterine device) in place      Consider annovera       15 minutes spent today with this patient. Greater than half spent in counseling today.

## 2020-09-28 ENCOUNTER — OFFICE VISIT (OUTPATIENT)
Dept: FAMILY MEDICINE | Facility: CLINIC | Age: 25
End: 2020-09-28
Payer: COMMERCIAL

## 2020-09-28 VITALS
WEIGHT: 163.38 LBS | DIASTOLIC BLOOD PRESSURE: 82 MMHG | SYSTOLIC BLOOD PRESSURE: 98 MMHG | OXYGEN SATURATION: 97 % | HEART RATE: 68 BPM | BODY MASS INDEX: 24.84 KG/M2 | TEMPERATURE: 98 F

## 2020-09-28 DIAGNOSIS — Z00.00 PREVENTATIVE HEALTH CARE: Primary | ICD-10-CM

## 2020-09-28 DIAGNOSIS — L25.9 CONTACT DERMATITIS, UNSPECIFIED CONTACT DERMATITIS TYPE, UNSPECIFIED TRIGGER: ICD-10-CM

## 2020-09-28 DIAGNOSIS — F41.1 GAD (GENERALIZED ANXIETY DISORDER): Chronic | ICD-10-CM

## 2020-09-28 PROCEDURE — 99999 PR PBB SHADOW E&M-EST. PATIENT-LVL III: CPT | Mod: PBBFAC,,, | Performed by: PHYSICIAN ASSISTANT

## 2020-09-28 PROCEDURE — 90686 IIV4 VACC NO PRSV 0.5 ML IM: CPT | Mod: S$GLB,,, | Performed by: PHYSICIAN ASSISTANT

## 2020-09-28 PROCEDURE — 3008F BODY MASS INDEX DOCD: CPT | Mod: CPTII,S$GLB,, | Performed by: PHYSICIAN ASSISTANT

## 2020-09-28 PROCEDURE — 90471 FLU VACCINE (QUAD) GREATER THAN OR EQUAL TO 3YO PRESERVATIVE FREE IM: ICD-10-PCS | Mod: S$GLB,,, | Performed by: PHYSICIAN ASSISTANT

## 2020-09-28 PROCEDURE — 90686 FLU VACCINE (QUAD) GREATER THAN OR EQUAL TO 3YO PRESERVATIVE FREE IM: ICD-10-PCS | Mod: S$GLB,,, | Performed by: PHYSICIAN ASSISTANT

## 2020-09-28 PROCEDURE — 99213 PR OFFICE/OUTPT VISIT, EST, LEVL III, 20-29 MIN: ICD-10-PCS | Mod: 25,S$GLB,, | Performed by: PHYSICIAN ASSISTANT

## 2020-09-28 PROCEDURE — 90471 IMMUNIZATION ADMIN: CPT | Mod: S$GLB,,, | Performed by: PHYSICIAN ASSISTANT

## 2020-09-28 PROCEDURE — 99213 OFFICE O/P EST LOW 20 MIN: CPT | Mod: 25,S$GLB,, | Performed by: PHYSICIAN ASSISTANT

## 2020-09-28 PROCEDURE — 3008F PR BODY MASS INDEX (BMI) DOCUMENTED: ICD-10-PCS | Mod: CPTII,S$GLB,, | Performed by: PHYSICIAN ASSISTANT

## 2020-09-28 PROCEDURE — 99999 PR PBB SHADOW E&M-EST. PATIENT-LVL III: ICD-10-PCS | Mod: PBBFAC,,, | Performed by: PHYSICIAN ASSISTANT

## 2020-09-28 RX ORDER — HALOBETASOL PROPIONATE 0.5 MG/G
AEROSOL, FOAM TOPICAL
Qty: 50 G | Refills: 2 | Status: SHIPPED | OUTPATIENT
Start: 2020-09-28 | End: 2021-04-17 | Stop reason: SDUPTHER

## 2020-09-28 RX ORDER — SERTRALINE HYDROCHLORIDE 25 MG/1
25 TABLET, FILM COATED ORAL DAILY
Qty: 30 TABLET | Refills: 11 | Status: SHIPPED | OUTPATIENT
Start: 2020-09-28 | End: 2021-04-17

## 2020-09-28 NOTE — PROGRESS NOTES
Subjective:      Patient ID: Phylicia Chamberlain is a 25 y.o. female.    Chief Complaint: Follow-up    HPI   Patient was seen by me 8/31/2020 and treated for anxiety with Buspar 5mg TID.  She is seeing Dr. Augustin regularly as well.  She does not see an improvement with Buspar.  She continues with anxiety and sleep disturbances.      Review of Systems   Constitutional: Negative for appetite change.   Respiratory: Negative for shortness of breath.    Cardiovascular: Negative for chest pain.   Psychiatric/Behavioral: Positive for sleep disturbance. Negative for suicidal ideas. The patient is nervous/anxious.        Objective:   BP 98/82   Pulse 68   Temp 98.3 °F (36.8 °C)   Wt 74.1 kg (163 lb 5.8 oz)   LMP 09/07/2020   SpO2 97%   BMI 24.84 kg/m²      Physical Exam  Vitals signs reviewed.   Constitutional:       General: She is not in acute distress.     Appearance: Normal appearance. She is well-developed and well-groomed.   HENT:      Head: Normocephalic and atraumatic.      Right Ear: Hearing and external ear normal.      Left Ear: Hearing and external ear normal.   Eyes:      General: Lids are normal.      Conjunctiva/sclera: Conjunctivae normal.   Neck:      Musculoskeletal: Normal range of motion.      Trachea: Phonation normal.   Cardiovascular:      Rate and Rhythm: Normal rate and regular rhythm.      Heart sounds: Normal heart sounds. No murmur. No friction rub. No gallop.    Pulmonary:      Effort: Pulmonary effort is normal. No respiratory distress.      Breath sounds: Normal breath sounds. No decreased breath sounds, wheezing, rhonchi or rales.   Musculoskeletal: Normal range of motion.   Skin:     General: Skin is warm and dry.      Findings: No rash.   Neurological:      General: No focal deficit present.      Mental Status: She is alert and oriented to person, place, and time.   Psychiatric:         Attention and Perception: Attention normal.         Mood and Affect: Mood normal.          Speech: Speech normal.         Behavior: Behavior normal. Behavior is cooperative.         Thought Content: Thought content normal.         Cognition and Memory: Cognition normal.         Judgment: Judgment normal.       Assessment:      1. Preventative health care    2. ANABEL (generalized anxiety disorder)    3. Contact dermatitis, unspecified contact dermatitis type, unspecified trigger       Plan:   1. Preventative health care  Bloodwork scheduled.  - Comprehensive metabolic panel; Future  - Lipid Panel; Future    2. ANABEL (generalized anxiety disorder)  Take Zoloft 25 mg daily.  Stay on the Buspar three times a day for a couple weeks.  Start tapering down to two times a day for 5 days and then one time a day and then discontinue.  - sertraline (ZOLOFT) 25 MG tablet; Take 1 tablet (25 mg total) by mouth once daily.  Dispense: 30 tablet; Refill: 11    3. Contact dermatitis, unspecified contact dermatitis type, unspecified trigger  Patient requested refill.  - halobetasol propionate (LEXETTE) 0.05 % Foam; aaa qd. Avoid use onface and groin.  Not more than 2 weeks straight in same location  Dispense: 50 g; Refill: 2    Follow up as needed.  Patient agreed with plan and expressed understanding.    Thank you for allowing me to serve you,

## 2020-09-28 NOTE — PATIENT INSTRUCTIONS
Take Zoloft 25 mg daily.  Stay on the Buspar three times a day for a couple weeks.  Start tapering down to two times a day for 5 days and then one time a day and then discontinue.    Thanks for seeing me,  Lisa Vegas PA-C

## 2020-10-01 ENCOUNTER — PATIENT MESSAGE (OUTPATIENT)
Dept: FAMILY MEDICINE | Facility: CLINIC | Age: 25
End: 2020-10-01

## 2020-10-02 ENCOUNTER — LAB VISIT (OUTPATIENT)
Dept: LAB | Facility: HOSPITAL | Age: 25
End: 2020-10-02
Attending: PHYSICIAN ASSISTANT
Payer: COMMERCIAL

## 2020-10-02 DIAGNOSIS — Z00.00 PREVENTATIVE HEALTH CARE: ICD-10-CM

## 2020-10-02 LAB
ALBUMIN SERPL BCP-MCNC: 4.7 G/DL (ref 3.5–5.2)
ALP SERPL-CCNC: 69 U/L (ref 55–135)
ALT SERPL W/O P-5'-P-CCNC: 13 U/L (ref 10–44)
ANION GAP SERPL CALC-SCNC: 12 MMOL/L (ref 8–16)
AST SERPL-CCNC: 16 U/L (ref 10–40)
BILIRUB SERPL-MCNC: 0.6 MG/DL (ref 0.1–1)
BUN SERPL-MCNC: 9 MG/DL (ref 6–20)
CALCIUM SERPL-MCNC: 9.3 MG/DL (ref 8.7–10.5)
CHLORIDE SERPL-SCNC: 101 MMOL/L (ref 95–110)
CHOLEST SERPL-MCNC: 196 MG/DL (ref 120–199)
CHOLEST/HDLC SERPL: 2.5 {RATIO} (ref 2–5)
CO2 SERPL-SCNC: 27 MMOL/L (ref 23–29)
CREAT SERPL-MCNC: 0.8 MG/DL (ref 0.5–1.4)
EST. GFR  (AFRICAN AMERICAN): >60 ML/MIN/1.73 M^2
EST. GFR  (NON AFRICAN AMERICAN): >60 ML/MIN/1.73 M^2
GLUCOSE SERPL-MCNC: 82 MG/DL (ref 70–110)
HDLC SERPL-MCNC: 77 MG/DL (ref 40–75)
HDLC SERPL: 39.3 % (ref 20–50)
LDLC SERPL CALC-MCNC: 110.6 MG/DL (ref 63–159)
NONHDLC SERPL-MCNC: 119 MG/DL
POTASSIUM SERPL-SCNC: 4.2 MMOL/L (ref 3.5–5.1)
PROT SERPL-MCNC: 7.3 G/DL (ref 6–8.4)
SODIUM SERPL-SCNC: 140 MMOL/L (ref 136–145)
TRIGL SERPL-MCNC: 42 MG/DL (ref 30–150)

## 2020-10-02 PROCEDURE — 80053 COMPREHEN METABOLIC PANEL: CPT | Mod: PO

## 2020-10-02 PROCEDURE — 80061 LIPID PANEL: CPT

## 2020-10-02 PROCEDURE — 36415 COLL VENOUS BLD VENIPUNCTURE: CPT | Mod: PO

## 2020-10-02 NOTE — PROGRESS NOTES
Primary Care Behavioral Health: Follow Up/Virtial Visit  Patient Name:  Phylicia Klein  Date:  10/5/20  Site:  [facility]  Referral source:  Lisa Vegas PA-C     The patient location is:  [address] [place] [address]  The patient phone number is: [phone]  Visit type: Virtual visit with synchronous audio and video  Each patient to whom he or she provides medical services by telemedicine is:  (1) informed of the relationship between the physician and patient and the respective role of any other health care provider with respect to management of the patient; and (2) notified that he or she may decline to receive medical services by telemedicine and may withdraw from such care at any time.     Chief complaint/reason for encounter:   ANABEL (generalized anxiety disorder)     History of present illness:  Ms. Phylicia Klein  is a 25 y.o. White  female referred Lisa Vegas PA-C by for symptoms of ANABEL (generalized anxiety disorder). Patient was seen, examined and chart was reviewed. Patient notes she has been working to challenge anxious thinking and identifying thinking errors.  Patient notes she has experienced some ongoing improvement in motivation and keeping to a daily routine.  Patient notes she does continue to experience some restless sleep around 4-5am.  Patient notes she started taking prescribed Zoloft 1 week ago.     No past medical history on file.       Current Outpatient Medications:     busPIRone (BUSPAR) 5 MG Tab, TAKE 1 TABLET BY MOUTH TWICE A DAY, Disp: 60 tablet, Rfl: 0    halobetasol propionate (LEXETTE) 0.05 % Foam, aaa qd. Avoid use onface and groin.  Not more than 2 weeks straight in same location, Disp: 50 g, Rfl: 2    ketoconazole (EXTINA) 2 % Foam, AAA trunk bid, Disp: 100 g, Rfl: 3    levonorgestrel (KYLEENA) 17.5 mcg/24 hrs (5 yrs) 19.5 mg IUD, 1 each by Intrauterine route once., Disp: , Rfl:     sertraline (ZOLOFT) 25 MG tablet, Take 1 tablet (25 mg total) by mouth  once daily., Disp: 30 tablet, Rfl: 11sp: , Rfl:      Psychiatric symptoms:  Depression:  Patient notes she experiences symptoms of dysphoric mood, decreased motivation, decreased desire to engage in tasks, irritability and sleep difficulty.  She denied suicidal ideation.  Shanell/Hypomania:  Denied.  She denied periods of elevated mood or abnormally increased energy or goal-directed activity.  Anxiety:  Patient endorses symptoms of nervousness and worry.  Thoughts:  Denied delusions, hallucinations.  Suicidal thoughts/behaviors: Patient denied suicidal and homicidal ideation, plan and intent.  Patient noted agreement to call 911/and or present to the ED if she experienced suicidal or homicidal ideation, plan or intent.    Self-injury:  Denied.  Sleep: Patient notes ongoing sleep difficulty.  Patient notes she has been laying down at 10:30pm and turns TV and phone around 11pm and then falls asleep around 11:30pm, waking in the middle of the night generally on one occasion 3-4am for 15 minutes. Patient notes she gets out of bed around 7am.   Trauma: Patient denies.     Mental Status Exam:  General appearance:  appears stated age, neatly dressed, well groomed  Speech:  Clear and intelligible, normal rate, normal tone, normal pitch, normal volume  Level of cooperation:  cooperative  Thought processes:  Linear, logical, goal-directed  Mood:  Generally euthymic  Thought content:  Relevant and appropriate  Affect: Congruent and appropriate  Orientation:  oriented to person, place, situation and date  Memory/Attention/Concentration:  No gross cognitive deficits made evident during conversation.  Judgment and insight: fair  Language:  Intact     PHQ9 10/5/2020   Total Score 8     GAD7 10/5/2020 9/15/2020 9/1/2020   1. Feeling nervous, anxious, or on edge? 2 3 3   2. Not being able to stop or control worrying? 2 2 3   3. Worrying too much about different things? 2 2 2   4. Trouble relaxing? 1 2 0   5. Being so restless that it  is hard to sit still? 0 0 0   6. Becoming easily annoyed or irritable? 2 2 1   7. Feeling afraid as if something awful might happen? 1 1 2   ANABEL-7 Score 10 12 11       Impression: Patient presents today noting some ongoing symptoms of depression and anxiety.  Patient does appear to have developed ongoing insight into symptoms and does appear to have been actively engaged in implementing strategies for management of such.      Diagnosis:  1. Depression, unspecified depression type      2. ANABEL (generalized anxiety disorder)  Ambulatory referral/consult to Psychology      Plan:    1.  Setting a daily schedule and daily routine with small attainable daily goals discussed.  2.  Challenging anxious thinking and thinking errors discussed.  3.  Sleep hygiene discussed.    4.  Patient to follow-up in 5 weeks.     Length of Session:  32 minutes

## 2020-10-05 ENCOUNTER — OFFICE VISIT (OUTPATIENT)
Dept: PSYCHIATRY | Facility: CLINIC | Age: 25
End: 2020-10-05
Payer: COMMERCIAL

## 2020-10-05 ENCOUNTER — PATIENT MESSAGE (OUTPATIENT)
Dept: PSYCHIATRY | Facility: CLINIC | Age: 25
End: 2020-10-05

## 2020-10-05 DIAGNOSIS — F32.A DEPRESSION, UNSPECIFIED DEPRESSION TYPE: ICD-10-CM

## 2020-10-05 DIAGNOSIS — F41.1 GAD (GENERALIZED ANXIETY DISORDER): Primary | ICD-10-CM

## 2020-10-05 PROCEDURE — 90832 PR PSYCHOTHERAPY W/PATIENT, 30 MIN: ICD-10-PCS | Mod: 95,,, | Performed by: PSYCHOLOGIST

## 2020-10-05 PROCEDURE — 90832 PSYTX W PT 30 MINUTES: CPT | Mod: 95,,, | Performed by: PSYCHOLOGIST

## 2020-10-08 ENCOUNTER — HOSPITAL ENCOUNTER (OUTPATIENT)
Dept: RADIOLOGY | Facility: HOSPITAL | Age: 25
Discharge: HOME | End: 2020-10-08
Attending: OBSTETRICS & GYNECOLOGY
Payer: COMMERCIAL

## 2020-10-08 DIAGNOSIS — N94.6 DYSMENORRHEA: ICD-10-CM

## 2020-10-08 PROCEDURE — 76830 TRANSVAGINAL US NON-OB: CPT | Mod: TC,PN

## 2020-10-08 PROCEDURE — 76830 TRANSVAGINAL US NON-OB: CPT | Mod: 26,,, | Performed by: RADIOLOGY

## 2020-10-08 PROCEDURE — 76830 US PELVIS COMPLETE WITH TRANSVAG FOR IUD: ICD-10-PCS | Mod: 26,,, | Performed by: RADIOLOGY

## 2020-10-08 PROCEDURE — 76856 US EXAM PELVIC COMPLETE: CPT | Mod: 26,,, | Performed by: RADIOLOGY

## 2020-10-08 PROCEDURE — 76856 US PELVIS COMPLETE WITH TRANSVAG FOR IUD: ICD-10-PCS | Mod: 26,,, | Performed by: RADIOLOGY

## 2020-11-27 NOTE — TELEPHONE ENCOUNTER
Pt will come in for 3:30pm   Alert-The patient is alert, awake and responds to voice. The patient is oriented to time, place, and person. The triage nurse is able to obtain subjective information.

## 2021-04-17 DIAGNOSIS — F41.1 GAD (GENERALIZED ANXIETY DISORDER): Chronic | ICD-10-CM

## 2021-04-17 DIAGNOSIS — L25.9 CONTACT DERMATITIS, UNSPECIFIED CONTACT DERMATITIS TYPE, UNSPECIFIED TRIGGER: ICD-10-CM

## 2021-04-17 RX ORDER — SERTRALINE HYDROCHLORIDE 25 MG/1
TABLET, FILM COATED ORAL
Qty: 90 TABLET | Refills: 1 | Status: SHIPPED | OUTPATIENT
Start: 2021-04-17 | End: 2021-10-30

## 2021-04-19 RX ORDER — HALOBETASOL PROPIONATE 0.5 MG/G
AEROSOL, FOAM TOPICAL
Qty: 50 G | Refills: 2 | Status: SHIPPED | OUTPATIENT
Start: 2021-04-19 | End: 2021-10-04 | Stop reason: SDUPTHER

## 2021-05-05 ENCOUNTER — TELEPHONE (OUTPATIENT)
Dept: FAMILY MEDICINE | Facility: CLINIC | Age: 26
End: 2021-05-05

## 2021-05-05 ENCOUNTER — PATIENT MESSAGE (OUTPATIENT)
Dept: DERMATOLOGY | Facility: CLINIC | Age: 26
End: 2021-05-05

## 2021-05-05 ENCOUNTER — OFFICE VISIT (OUTPATIENT)
Dept: FAMILY MEDICINE | Facility: CLINIC | Age: 26
End: 2021-05-05
Payer: COMMERCIAL

## 2021-05-05 ENCOUNTER — LAB VISIT (OUTPATIENT)
Dept: LAB | Facility: HOSPITAL | Age: 26
End: 2021-05-05
Attending: FAMILY MEDICINE
Payer: COMMERCIAL

## 2021-05-05 DIAGNOSIS — R30.0 DYSURIA: Primary | ICD-10-CM

## 2021-05-05 DIAGNOSIS — L30.9 DERMATITIS: Primary | ICD-10-CM

## 2021-05-05 DIAGNOSIS — F41.1 GAD (GENERALIZED ANXIETY DISORDER): ICD-10-CM

## 2021-05-05 DIAGNOSIS — R30.0 DYSURIA: ICD-10-CM

## 2021-05-05 LAB
BACTERIA #/AREA URNS HPF: ABNORMAL /HPF
BILIRUB UR QL STRIP: NEGATIVE
CLARITY UR: ABNORMAL
COLOR UR: YELLOW
GLUCOSE UR QL STRIP: NEGATIVE
HGB UR QL STRIP: ABNORMAL
HYALINE CASTS #/AREA URNS LPF: 0 /LPF
KETONES UR QL STRIP: ABNORMAL
LEUKOCYTE ESTERASE UR QL STRIP: ABNORMAL
MICROSCOPIC COMMENT: ABNORMAL
NITRITE UR QL STRIP: NEGATIVE
PH UR STRIP: 6 [PH] (ref 5–8)
PROT UR QL STRIP: ABNORMAL
RBC #/AREA URNS HPF: 10 /HPF (ref 0–4)
SP GR UR STRIP: 1.02 (ref 1–1.03)
SQUAMOUS #/AREA URNS HPF: 2 /HPF
URN SPEC COLLECT METH UR: ABNORMAL
WBC #/AREA URNS HPF: 30 /HPF (ref 0–5)
WBC CLUMPS URNS QL MICRO: ABNORMAL

## 2021-05-05 PROCEDURE — 87077 CULTURE AEROBIC IDENTIFY: CPT | Performed by: FAMILY MEDICINE

## 2021-05-05 PROCEDURE — 81000 URINALYSIS NONAUTO W/SCOPE: CPT | Mod: PO | Performed by: FAMILY MEDICINE

## 2021-05-05 PROCEDURE — 87186 SC STD MICRODIL/AGAR DIL: CPT | Performed by: FAMILY MEDICINE

## 2021-05-05 PROCEDURE — 99213 PR OFFICE/OUTPT VISIT, EST, LEVL III, 20-29 MIN: ICD-10-PCS | Mod: 95,,, | Performed by: FAMILY MEDICINE

## 2021-05-05 PROCEDURE — 99213 OFFICE O/P EST LOW 20 MIN: CPT | Mod: 95,,, | Performed by: FAMILY MEDICINE

## 2021-05-05 PROCEDURE — 87086 URINE CULTURE/COLONY COUNT: CPT | Performed by: FAMILY MEDICINE

## 2021-05-05 PROCEDURE — 87088 URINE BACTERIA CULTURE: CPT | Performed by: FAMILY MEDICINE

## 2021-05-05 RX ORDER — CIPROFLOXACIN 500 MG/1
500 TABLET ORAL 2 TIMES DAILY
Qty: 14 TABLET | Refills: 0 | Status: SHIPPED | OUTPATIENT
Start: 2021-05-05 | End: 2021-05-12

## 2021-05-06 ENCOUNTER — PATIENT MESSAGE (OUTPATIENT)
Dept: RESEARCH | Facility: HOSPITAL | Age: 26
End: 2021-05-06

## 2021-05-06 RX ORDER — HALOBETASOL PROPIONATE 0.5 MG/G
AEROSOL, FOAM TOPICAL
Qty: 50 G | Refills: 3 | Status: SHIPPED | OUTPATIENT
Start: 2021-05-06 | End: 2023-07-31

## 2021-05-08 LAB — BACTERIA UR CULT: ABNORMAL

## 2021-05-18 DIAGNOSIS — M25.561 RIGHT KNEE PAIN, UNSPECIFIED CHRONICITY: Primary | ICD-10-CM

## 2021-05-19 ENCOUNTER — OFFICE VISIT (OUTPATIENT)
Dept: ORTHOPEDICS | Facility: CLINIC | Age: 26
End: 2021-05-19
Payer: COMMERCIAL

## 2021-05-19 ENCOUNTER — HOSPITAL ENCOUNTER (OUTPATIENT)
Dept: RADIOLOGY | Facility: HOSPITAL | Age: 26
Discharge: HOME | End: 2021-05-19
Attending: ORTHOPAEDIC SURGERY
Payer: COMMERCIAL

## 2021-05-19 VITALS — BODY MASS INDEX: 24.76 KG/M2 | WEIGHT: 163.38 LBS | HEIGHT: 68 IN

## 2021-05-19 DIAGNOSIS — M25.561 RIGHT KNEE PAIN, UNSPECIFIED CHRONICITY: Primary | ICD-10-CM

## 2021-05-19 DIAGNOSIS — M25.561 RIGHT KNEE PAIN, UNSPECIFIED CHRONICITY: ICD-10-CM

## 2021-05-19 PROCEDURE — 99203 PR OFFICE/OUTPT VISIT, NEW, LEVL III, 30-44 MIN: ICD-10-PCS | Mod: S$GLB,,, | Performed by: ORTHOPAEDIC SURGERY

## 2021-05-19 PROCEDURE — 73562 X-RAY EXAM OF KNEE 3: CPT | Mod: 26,RT,, | Performed by: RADIOLOGY

## 2021-05-19 PROCEDURE — 73562 XR KNEE ORTHO RIGHT: ICD-10-PCS | Mod: 26,RT,, | Performed by: RADIOLOGY

## 2021-05-19 PROCEDURE — 3008F PR BODY MASS INDEX (BMI) DOCUMENTED: ICD-10-PCS | Mod: CPTII,S$GLB,, | Performed by: ORTHOPAEDIC SURGERY

## 2021-05-19 PROCEDURE — 1125F PR PAIN SEVERITY QUANTIFIED, PAIN PRESENT: ICD-10-PCS | Mod: S$GLB,,, | Performed by: ORTHOPAEDIC SURGERY

## 2021-05-19 PROCEDURE — 73560 X-RAY EXAM OF KNEE 1 OR 2: CPT | Mod: TC,PO,LT

## 2021-05-19 PROCEDURE — 73560 X-RAY EXAM OF KNEE 1 OR 2: CPT | Mod: 26,LT,, | Performed by: RADIOLOGY

## 2021-05-19 PROCEDURE — 99999 PR PBB SHADOW E&M-EST. PATIENT-LVL III: CPT | Mod: PBBFAC,,, | Performed by: ORTHOPAEDIC SURGERY

## 2021-05-19 PROCEDURE — 1125F AMNT PAIN NOTED PAIN PRSNT: CPT | Mod: S$GLB,,, | Performed by: ORTHOPAEDIC SURGERY

## 2021-05-19 PROCEDURE — 99203 OFFICE O/P NEW LOW 30 MIN: CPT | Mod: S$GLB,,, | Performed by: ORTHOPAEDIC SURGERY

## 2021-05-19 PROCEDURE — 99999 PR PBB SHADOW E&M-EST. PATIENT-LVL III: ICD-10-PCS | Mod: PBBFAC,,, | Performed by: ORTHOPAEDIC SURGERY

## 2021-05-19 PROCEDURE — 3008F BODY MASS INDEX DOCD: CPT | Mod: CPTII,S$GLB,, | Performed by: ORTHOPAEDIC SURGERY

## 2021-05-19 PROCEDURE — 73560 XR KNEE ORTHO RIGHT: ICD-10-PCS | Mod: 26,LT,, | Performed by: RADIOLOGY

## 2021-10-04 DIAGNOSIS — L25.9 CONTACT DERMATITIS, UNSPECIFIED CONTACT DERMATITIS TYPE, UNSPECIFIED TRIGGER: ICD-10-CM

## 2021-10-06 RX ORDER — HALOBETASOL PROPIONATE 0.5 MG/G
AEROSOL, FOAM TOPICAL
Qty: 50 G | Refills: 2 | Status: SHIPPED | OUTPATIENT
Start: 2021-10-06 | End: 2023-07-31

## 2022-04-07 ENCOUNTER — PATIENT OUTREACH (OUTPATIENT)
Dept: ADMINISTRATIVE | Facility: OTHER | Age: 27
End: 2022-04-07
Payer: COMMERCIAL

## 2022-04-08 ENCOUNTER — OFFICE VISIT (OUTPATIENT)
Dept: ALLERGY | Facility: CLINIC | Age: 27
End: 2022-04-08
Payer: COMMERCIAL

## 2022-04-08 VITALS — WEIGHT: 168.44 LBS | HEIGHT: 68 IN | BODY MASS INDEX: 25.53 KG/M2

## 2022-04-08 DIAGNOSIS — L29.9 PRURITUS: ICD-10-CM

## 2022-04-08 DIAGNOSIS — L30.9 DERMATITIS: Primary | ICD-10-CM

## 2022-04-08 PROCEDURE — 99204 PR OFFICE/OUTPT VISIT, NEW, LEVL IV, 45-59 MIN: ICD-10-PCS | Mod: S$GLB,,, | Performed by: ALLERGY & IMMUNOLOGY

## 2022-04-08 PROCEDURE — 3008F BODY MASS INDEX DOCD: CPT | Mod: CPTII,S$GLB,, | Performed by: ALLERGY & IMMUNOLOGY

## 2022-04-08 PROCEDURE — 1160F PR REVIEW ALL MEDS BY PRESCRIBER/CLIN PHARMACIST DOCUMENTED: ICD-10-PCS | Mod: CPTII,S$GLB,, | Performed by: ALLERGY & IMMUNOLOGY

## 2022-04-08 PROCEDURE — 99999 PR PBB SHADOW E&M-EST. PATIENT-LVL III: CPT | Mod: PBBFAC,,, | Performed by: ALLERGY & IMMUNOLOGY

## 2022-04-08 PROCEDURE — 3008F PR BODY MASS INDEX (BMI) DOCUMENTED: ICD-10-PCS | Mod: CPTII,S$GLB,, | Performed by: ALLERGY & IMMUNOLOGY

## 2022-04-08 PROCEDURE — 1159F MED LIST DOCD IN RCRD: CPT | Mod: CPTII,S$GLB,, | Performed by: ALLERGY & IMMUNOLOGY

## 2022-04-08 PROCEDURE — 1159F PR MEDICATION LIST DOCUMENTED IN MEDICAL RECORD: ICD-10-PCS | Mod: CPTII,S$GLB,, | Performed by: ALLERGY & IMMUNOLOGY

## 2022-04-08 PROCEDURE — 1160F RVW MEDS BY RX/DR IN RCRD: CPT | Mod: CPTII,S$GLB,, | Performed by: ALLERGY & IMMUNOLOGY

## 2022-04-08 PROCEDURE — 99999 PR PBB SHADOW E&M-EST. PATIENT-LVL III: ICD-10-PCS | Mod: PBBFAC,,, | Performed by: ALLERGY & IMMUNOLOGY

## 2022-04-08 PROCEDURE — 99204 OFFICE O/P NEW MOD 45 MIN: CPT | Mod: S$GLB,,, | Performed by: ALLERGY & IMMUNOLOGY

## 2022-04-08 RX ORDER — NITROFURANTOIN 25; 75 MG/1; MG/1
100 CAPSULE ORAL 2 TIMES DAILY
COMMUNITY
Start: 2021-11-12 | End: 2022-04-08

## 2022-04-08 RX ORDER — TRIAMCINOLONE ACETONIDE 1 MG/G
CREAM TOPICAL 2 TIMES DAILY
Qty: 45 G | Refills: 1 | Status: SHIPPED | OUTPATIENT
Start: 2022-04-08 | End: 2023-07-31

## 2022-04-08 NOTE — PROGRESS NOTES
Subjective:       Patient ID: Phylicia Chamberlain is a 26 y.o. female.    Chief Complaint:  Rash and Itching      25 yo woman presents for new patient evaluation of rash. She states she gets rashes on her chest, sides of breasts and back. It is red raised and somewhat itchy. She feels like it oozes as will have crystal like crust that she can scratch off. Constant. Once there lasts for days. She has seen derm and told contact derm and given steroid foam, this will clear it but told not to use often. Not riggers she can tell. she has no rhinitis, no sneeze, runny or stuffy nose. No asthma, no eczema as child. No food, insect or latex allergy. No other medical issues      Environmental History: see history section for home environment  Review of Systems   Constitutional: Negative for appetite change, chills, fatigue and fever.   HENT: Negative for congestion, ear discharge, ear pain, facial swelling, nosebleeds, postnasal drip, rhinorrhea, sinus pressure, sneezing, sore throat, trouble swallowing and voice change.    Eyes: Negative for discharge, redness, itching and visual disturbance.   Respiratory: Negative for cough, choking, chest tightness, shortness of breath and wheezing.    Cardiovascular: Negative for chest pain, palpitations and leg swelling.   Gastrointestinal: Negative for abdominal distention, abdominal pain, constipation, diarrhea, nausea and vomiting.   Genitourinary: Negative for difficulty urinating.   Musculoskeletal: Negative for arthralgias, gait problem, joint swelling and myalgias.   Skin: Positive for color change and rash.   Neurological: Negative for dizziness, syncope, weakness, light-headedness and headaches.   Hematological: Negative for adenopathy. Does not bruise/bleed easily.   Psychiatric/Behavioral: Negative for agitation, behavioral problems, confusion and sleep disturbance. The patient is not nervous/anxious.         Objective:      Physical Exam  Vitals and nursing note  reviewed.   Constitutional:       General: She is not in acute distress.     Appearance: Normal appearance. She is not ill-appearing.   HENT:      Right Ear: External ear normal.      Left Ear: External ear normal.      Nose: No rhinorrhea.   Eyes:      General:         Right eye: No discharge.         Left eye: No discharge.      Conjunctiva/sclera: Conjunctivae normal.   Pulmonary:      Effort: Pulmonary effort is normal. No respiratory distress.   Abdominal:      General: There is no distension.   Musculoskeletal:         General: Normal range of motion.      Cervical back: Normal range of motion.   Skin:     General: Skin is warm and dry.      Findings: Erythema and rash (small red macules on arms and upper chest, breasts and back not examined as she statesd looks the same) present.   Neurological:      Mental Status: She is alert and oriented to person, place, and time.   Psychiatric:         Mood and Affect: Mood normal.         Behavior: Behavior normal.         Thought Content: Thought content normal.         Judgment: Judgment normal.         Laboratory:   none performed   Assessment:       1. Dermatitis    2. Pruritus         Plan:       1. advised pt rash not C/e eczema or urticaria so not likely IgE mediated so testing for foods or inhalants not helpful. Advised likely more contact derm and needs patch test, referred to derm  2. TAC BID for up to a week to clear current flare  3. H 1 blocker as needed for itching

## 2022-11-14 ENCOUNTER — LAB VISIT (OUTPATIENT)
Dept: LAB | Facility: HOSPITAL | Age: 27
End: 2022-11-14
Attending: STUDENT IN AN ORGANIZED HEALTH CARE EDUCATION/TRAINING PROGRAM
Payer: COMMERCIAL

## 2022-11-14 DIAGNOSIS — Z11.4 ENCOUNTER FOR SCREENING FOR HUMAN IMMUNODEFICIENCY VIRUS (HIV): Primary | ICD-10-CM

## 2022-11-14 DIAGNOSIS — Z11.4 ENCOUNTER FOR SCREENING FOR HUMAN IMMUNODEFICIENCY VIRUS (HIV): ICD-10-CM

## 2022-11-14 LAB — HIV 1+2 AB+HIV1 P24 AG SERPL QL IA: NORMAL

## 2022-11-14 PROCEDURE — 87389 HIV-1 AG W/HIV-1&-2 AB AG IA: CPT | Performed by: STUDENT IN AN ORGANIZED HEALTH CARE EDUCATION/TRAINING PROGRAM

## 2022-11-14 PROCEDURE — 36415 COLL VENOUS BLD VENIPUNCTURE: CPT | Mod: PN | Performed by: STUDENT IN AN ORGANIZED HEALTH CARE EDUCATION/TRAINING PROGRAM

## 2022-11-14 NOTE — PROGRESS NOTES
Subjective:      Patient ID: Phylicia Chamberlain is a 27 y.o. female    No chief complaint on file.    HPI  27 y.o. female with a PMHx as documented below presents to clinic today for the following:    No past medical history on file.   ROS    Objective:      There were no vitals filed for this visit.    Physical Exam   Assessment:       No diagnosis found.     Plan:       There are no diagnoses linked to this encounter.    No follow-ups on file.    Mary Blount MD  [facility]  Office: [phone]   Fax: [phone]  11/14/2022      Disclaimer: This note was partly generated using dictation software which may occasionally result in transcription errors.

## 2022-11-15 ENCOUNTER — PATIENT MESSAGE (OUTPATIENT)
Dept: FAMILY MEDICINE | Facility: CLINIC | Age: 27
End: 2022-11-15
Payer: COMMERCIAL

## 2023-01-18 ENCOUNTER — OFFICE VISIT (OUTPATIENT)
Dept: DERMATOLOGY | Facility: CLINIC | Age: 28
End: 2023-01-18
Payer: COMMERCIAL

## 2023-01-18 DIAGNOSIS — L30.9 ECZEMA, UNSPECIFIED TYPE: ICD-10-CM

## 2023-01-18 DIAGNOSIS — L21.9 SEBORRHEIC DERMATITIS: Primary | ICD-10-CM

## 2023-01-18 PROCEDURE — 1160F PR REVIEW ALL MEDS BY PRESCRIBER/CLIN PHARMACIST DOCUMENTED: ICD-10-PCS | Mod: CPTII,95,, | Performed by: STUDENT IN AN ORGANIZED HEALTH CARE EDUCATION/TRAINING PROGRAM

## 2023-01-18 PROCEDURE — 99204 PR OFFICE/OUTPT VISIT, NEW, LEVL IV, 45-59 MIN: ICD-10-PCS | Mod: 95,,, | Performed by: STUDENT IN AN ORGANIZED HEALTH CARE EDUCATION/TRAINING PROGRAM

## 2023-01-18 PROCEDURE — 1160F RVW MEDS BY RX/DR IN RCRD: CPT | Mod: CPTII,95,, | Performed by: STUDENT IN AN ORGANIZED HEALTH CARE EDUCATION/TRAINING PROGRAM

## 2023-01-18 PROCEDURE — 1159F PR MEDICATION LIST DOCUMENTED IN MEDICAL RECORD: ICD-10-PCS | Mod: CPTII,95,, | Performed by: STUDENT IN AN ORGANIZED HEALTH CARE EDUCATION/TRAINING PROGRAM

## 2023-01-18 PROCEDURE — 99204 OFFICE O/P NEW MOD 45 MIN: CPT | Mod: 95,,, | Performed by: STUDENT IN AN ORGANIZED HEALTH CARE EDUCATION/TRAINING PROGRAM

## 2023-01-18 PROCEDURE — 1159F MED LIST DOCD IN RCRD: CPT | Mod: CPTII,95,, | Performed by: STUDENT IN AN ORGANIZED HEALTH CARE EDUCATION/TRAINING PROGRAM

## 2023-01-18 RX ORDER — FLUOCINONIDE TOPICAL SOLUTION USP, 0.05% 0.5 MG/ML
SOLUTION TOPICAL 2 TIMES DAILY
Qty: 60 ML | Refills: 5 | Status: SHIPPED | OUTPATIENT
Start: 2023-01-18 | End: 2023-07-31

## 2023-01-18 RX ORDER — DESONIDE 0.5 MG/G
CREAM TOPICAL 2 TIMES DAILY
Qty: 60 G | Refills: 1 | Status: SHIPPED | OUTPATIENT
Start: 2023-01-18 | End: 2023-07-31

## 2023-01-18 RX ORDER — KETOCONAZOLE 20 MG/ML
SHAMPOO, SUSPENSION TOPICAL WEEKLY
Qty: 120 ML | Refills: 5 | Status: SHIPPED | OUTPATIENT
Start: 2023-01-18 | End: 2023-07-31

## 2023-01-18 NOTE — PROGRESS NOTES
Patient Information  Name: Phylicia Chamberlain  : 1995  MRN: [id number]     Referring Physician:  Dr. Orozco ref. provider found   Primary Care Physician:  Dr. Trae Mauricio Jr, MD   Date of Visit: 2023      Subjective:       Phylicia Chamberlain is a 27 y.o. female who presents for rash    HPI  The patient location is: [place], [place]  The chief complaint leading to consultation is: rash    Visit type: audiovisual    Face to Face time with patient: 8 min  10 minutes of total time spent on the encounter, which includes face to face time and non-face to face time preparing to see the patient (eg, review of tests), Obtaining and/or reviewing separately obtained history, Documenting clinical information in the electronic or other health record, Independently interpreting results (not separately reported) and communicating results to the patient/family/caregiver, or Care coordination (not separately reported).     Each patient to whom he or she provides medical services by telemedicine is:  (1) informed of the relationship between the physician and patient and the respective role of any other health care provider with respect to management of the patient; and (2) notified that he or she may decline to receive medical services by telemedicine and may withdraw from such care at any time.    Notes:   Patient with new complaint of lesion(s)  Location: left undereye  Duration: 1 year  Symptoms: dry patch  Relieving factors/Previous treatments: none    +family hx of psoriasis. Reports having scaling on scalp and ears    Patient was last seen:Visit date not found     Prior notes by myself reviewed.   Clinical documentation obtained by nursing staff reviewed.    Review of Systems   Skin:  Positive for itching and rash.      Objective:    Physical Exam   Constitutional: She appears well-developed and well-nourished. No distress.   Neurological: She is alert and oriented to person, place, and time.  She is not disoriented.   Psychiatric: She has a normal mood and affect.   Skin:   Areas Examined (abnormalities noted in diagram):   Head / Face Inspection Performed            Diagram Legend     Erythematous scaling macule/papule c/w actinic keratosis       Vascular papule c/w angioma      Pigmented verrucoid papule/plaque c/w seborrheic keratosis      Yellow umbilicated papule c/w sebaceous hyperplasia      Irregularly shaped tan macule c/w lentigo     1-2 mm smooth white papules consistent with Milia      Movable subcutaneous cyst with punctum c/w epidermal inclusion cyst      Subcutaneous movable cyst c/w pilar cyst      Firm pink to brown papule c/w dermatofibroma      Pedunculated fleshy papule(s) c/w skin tag(s)      Evenly pigmented macule c/w junctional nevus     Mildly variegated pigmented, slightly irregular-bordered macule c/w mildly atypical nevus      Flesh colored to evenly pigmented papule c/w intradermal nevus       Pink pearly papule/plaque c/w basal cell carcinoma      Erythematous hyperkeratotic cursted plaque c/w SCC      Surgical scar with no sign of skin cancer recurrence      Open and closed comedones      Inflammatory papules and pustules      Verrucoid papule consistent consistent with wart     Erythematous eczematous patches and plaques     Dystrophic onycholytic nail with subungual debris c/w onychomycosis     Umbilicated papule    Erythematous-base heme-crusted tan verrucoid plaque consistent with inflamed seborrheic keratosis     Erythematous Silvery Scaling Plaque c/w Psoriasis     See annotation            [] Data reviewed  [] Independent review of test  [] Management discussed with another provider    Assessment / Plan:        Seborrheic dermatitis  -     fluocinonide (LIDEX) 0.05 % external solution; Apply topically 2 (two) times daily. Use on scalp and ears  Dispense: 60 mL; Refill: 5  -     ketoconazole (NIZORAL) 2 % shampoo; Apply topically once a week. Lather in for 5-10 min  before rinsing  Dispense: 120 mL; Refill: 5    Eczema, unspecified type vs ?psoriasis given extensive family hx, scaling on scalp and ears   -     desonide (DESOWEN) 0.05 % cream; Apply topically 2 (two) times daily. Use for up to 2 weeks at a time. Use on face  Dispense: 60 g; Refill: 1  Counseling on topical steroids:  Patient counseled that the prolonged use of topical steroids can result in the increased appearance superficial blood vessels (telangiectasias) lightening (hypopigmentation), and   thinning of the skin ( atrophy).  Patient understands to avoid using high potency steroids in skin folds, the groin or the face.  The patient verbalized understanding of proper use and possible adverse effects of topical steroids.  All patient's questions and concerns were addressed.               LOS NUMBER AND COMPLEXITY OF PROBLEMS    COMPLEXITY OF DATA RISK TOTAL TIME (m)   61893  19242 [] 1 self-limited or minor problem [x] Minimal to none [] No treatment recommended or patient to monitor 15-29  10-19   24933  75112 Low  [] 2 or > self limited or minor problems  [] 1 stable chronic illness  [] 1 acute, uncomplicated illness or injury Limited (2)  [] Prior external notes from each unique source  [] Review result of each unique test  [] Order each unique test []  Low  OTC medications, minor skin biopsy 30-44 20-29   87415  50575 Moderate  [x]  1 or > chronic illness with progression, exacerbation or SE of treatment  []  2 or more stable chronic illnesses  []  1 acute illness with systemic symptoms  []  1 acute complicated injury  []  1 undiagnosed new problem with uncertain prognosis Moderate (1/3 below)  []  3 or more data items        *Now includes assessment requiring independent historian  []  Independent interpretation of a test  []  Discuss management/test with another provider Moderate  [x]  Prescription drug mgmt  []  Minor surgery with risk discussed  []  Mgmt limited by social determinates 45-59  30-39    65428  98019 High  []  1 or more chronic illness with severe exacerbation, progression or SE of treatment  []  1 acute or chronic illness/injury that poses a threat to life or bodily function Extensive (2/3 below)  []  3 or more data items        *Now includes assessment requiring independent historian.  []  Independent interpretation of a test  []  Discuss management/test with another provider High  []  Major surgery with risk discussed  []  Drug therapy requiring intensive monitoring for toxicity  []  Hospitalization  []  Decision for DNR 60-74  40-54      No follow-ups on file.    Yaritza Johnston MD, FAAD  [facility]

## 2023-02-15 ENCOUNTER — OFFICE VISIT (OUTPATIENT)
Dept: OBSTETRICS AND GYNECOLOGY | Facility: CLINIC | Age: 28
End: 2023-02-15
Payer: COMMERCIAL

## 2023-02-15 VITALS
HEIGHT: 67 IN | WEIGHT: 172.38 LBS | BODY MASS INDEX: 27.06 KG/M2 | DIASTOLIC BLOOD PRESSURE: 80 MMHG | SYSTOLIC BLOOD PRESSURE: 122 MMHG

## 2023-02-15 DIAGNOSIS — Z01.419 GYNECOLOGIC EXAM NORMAL: Primary | ICD-10-CM

## 2023-02-15 PROCEDURE — 99999 PR PBB SHADOW E&M-EST. PATIENT-LVL III: ICD-10-PCS | Mod: PBBFAC,,, | Performed by: OBSTETRICS & GYNECOLOGY

## 2023-02-15 PROCEDURE — 88175 CYTOPATH C/V AUTO FLUID REDO: CPT | Performed by: OBSTETRICS & GYNECOLOGY

## 2023-02-15 PROCEDURE — 3008F BODY MASS INDEX DOCD: CPT | Mod: CPTII,S$GLB,, | Performed by: OBSTETRICS & GYNECOLOGY

## 2023-02-15 PROCEDURE — 3074F PR MOST RECENT SYSTOLIC BLOOD PRESSURE < 130 MM HG: ICD-10-PCS | Mod: CPTII,S$GLB,, | Performed by: OBSTETRICS & GYNECOLOGY

## 2023-02-15 PROCEDURE — 1159F PR MEDICATION LIST DOCUMENTED IN MEDICAL RECORD: ICD-10-PCS | Mod: CPTII,S$GLB,, | Performed by: OBSTETRICS & GYNECOLOGY

## 2023-02-15 PROCEDURE — 58301 PR REMOVE, INTRAUTERINE DEVICE: ICD-10-PCS | Mod: S$GLB,,, | Performed by: OBSTETRICS & GYNECOLOGY

## 2023-02-15 PROCEDURE — 3079F DIAST BP 80-89 MM HG: CPT | Mod: CPTII,S$GLB,, | Performed by: OBSTETRICS & GYNECOLOGY

## 2023-02-15 PROCEDURE — 3008F PR BODY MASS INDEX (BMI) DOCUMENTED: ICD-10-PCS | Mod: CPTII,S$GLB,, | Performed by: OBSTETRICS & GYNECOLOGY

## 2023-02-15 PROCEDURE — 58301 REMOVE INTRAUTERINE DEVICE: CPT | Mod: S$GLB,,, | Performed by: OBSTETRICS & GYNECOLOGY

## 2023-02-15 PROCEDURE — 3074F SYST BP LT 130 MM HG: CPT | Mod: CPTII,S$GLB,, | Performed by: OBSTETRICS & GYNECOLOGY

## 2023-02-15 PROCEDURE — 3079F PR MOST RECENT DIASTOLIC BLOOD PRESSURE 80-89 MM HG: ICD-10-PCS | Mod: CPTII,S$GLB,, | Performed by: OBSTETRICS & GYNECOLOGY

## 2023-02-15 PROCEDURE — 99395 PR PREVENTIVE VISIT,EST,18-39: ICD-10-PCS | Mod: 25,S$GLB,, | Performed by: OBSTETRICS & GYNECOLOGY

## 2023-02-15 PROCEDURE — 99395 PREV VISIT EST AGE 18-39: CPT | Mod: 25,S$GLB,, | Performed by: OBSTETRICS & GYNECOLOGY

## 2023-02-15 PROCEDURE — 1159F MED LIST DOCD IN RCRD: CPT | Mod: CPTII,S$GLB,, | Performed by: OBSTETRICS & GYNECOLOGY

## 2023-02-15 PROCEDURE — 99999 PR PBB SHADOW E&M-EST. PATIENT-LVL III: CPT | Mod: PBBFAC,,, | Performed by: OBSTETRICS & GYNECOLOGY

## 2023-02-15 NOTE — PROGRESS NOTES
Chief Complaint   Patient presents with    Well Woman    IUD removal       History of Present Illness: Phylicia Chamberlain is a 27 y.o. female that presents today 2/15/2023 for well gyn visit.    History reviewed. No pertinent past medical history.    Past Surgical History:   Procedure Laterality Date    ANTERIOR CRUCIATE LIGAMENT REPAIR  2010       Current Outpatient Medications   Medication Sig Dispense Refill    fluocinonide (LIDEX) 0.05 % external solution Apply topically 2 (two) times daily. Use on scalp and ears 60 mL 5    halobetasol propionate (LEXETTE) 0.05 % Foam aaa qd prn flare 50 g 3    ketoconazole (NIZORAL) 2 % shampoo Apply topically once a week. Lather in for 5-10 min before rinsing 120 mL 5    levonorgestreL (KYLEENA) 17.5 mcg/24 hrs (5 yrs) 19.5 mg IUD 1 each by Intrauterine route once.      desonide (DESOWEN) 0.05 % cream Apply topically 2 (two) times daily. Use for up to 2 weeks at a time. Use on face (Patient not taking: Reported on 2/15/2023) 60 g 1    halobetasol propionate (LEXETTE) 0.05 % Foam aaa qd. Avoid use onface and groin.  Not more than 2 weeks straight in same location (Patient not taking: Reported on 2/15/2023) 50 g 2    ketoconazole (EXTINA) 2 % Foam AAA trunk bid (Patient not taking: Reported on 2/15/2023) 100 g 3    sertraline (ZOLOFT) 25 MG tablet TAKE 1 TABLET BY MOUTH EVERY DAY (Patient not taking: Reported on 2/15/2023) 90 tablet 1    triamcinolone acetonide 0.1% (KENALOG) 0.1 % cream Apply topically 2 (two) times daily. (Patient not taking: Reported on 2/15/2023) 45 g 1     No current facility-administered medications for this visit.       Review of patient's allergies indicates:   Allergen Reactions    Sulfa (sulfonamide antibiotics)      Other reaction(s): Hives    Sulfamethoxazole      Other reaction(s): Hives    Trimethoprim      Other reaction(s): Hives       Family History   Problem Relation Age of Onset    No Known Problems Mother     No Known Problems  "Father     No Known Problems Sister     Psoriasis Maternal Aunt     Heart disease Maternal Aunt 42        CABG    Breast cancer Maternal Aunt     Psoriasis Maternal Uncle     Psoriasis Maternal Grandfather     Heart disease Maternal Grandfather 46        cv disease    Breast cancer Maternal Aunt     Eczema Neg Hx     Melanoma Neg Hx     Ovarian cancer Neg Hx     Asthma Neg Hx     Allergies Neg Hx     Angioedema Neg Hx     Immunodeficiency Neg Hx     Allergic rhinitis Neg Hx     Atopy Neg Hx     Rhinitis Neg Hx     Urticaria Neg Hx        Social History     Socioeconomic History    Marital status:    Occupational History    Occupation: Student   Tobacco Use    Smoking status: Never    Smokeless tobacco: Never   Substance and Sexual Activity    Alcohol use: Yes     Comment: once or twice a week    Drug use: No    Sexual activity: Not Currently     Birth control/protection: None   Other Topics Concern    Are you pregnant or think you may be? No    Breast-feeding No       OB History    Para Term  AB Living   0 0 0 0 0 0   SAB IAB Ectopic Multiple Live Births   0 0 0 0         Review of Symptoms:  GENERAL: Denies weight gain or weight loss. Feeling well overall.   SKIN: Denies rash or lesions.   HEAD: Denies head injury or headache.   NODES: Denies enlarged lymph nodes.   CHEST: Denies chest pain or shortness of breath.   CARDIOVASCULAR: Denies palpitations or left sided chest pain.   ABDOMEN: No abdominal pain, constipation, diarrhea, nausea, vomiting or rectal bleeding.   URINARY: No frequency, dysuria, hematuria, or burning on urination.  HEMATOLOGIC: No easy bruisability or excessive bleeding.   MUSCULOSKELETAL: Denies joint pain or swelling.     /80   Ht 5' 7" (1.702 m)   Wt 78.2 kg (172 lb 6.4 oz)   Physical Exam:  APPEARANCE: Well nourished, well developed, in no acute distress.  SKIN: Normal skin turgor, no lesions.  NECK: Neck symmetric without masses   RESPIRATORY: Normal " respiratory effort with no retractions or use of accessory muscles  CARDIOVASCULAR: Peripheral vascular system with no swelling no varicosities and palpation of pulses normal  LYMPHATIC: No enlargements of the lymph nodes noted in the neck, axillae, or groin  ABDOMEN: Soft. No tenderness or masses. No hepatosplenomegaly. No hernias.  BREASTS: Symmetrical, no skin changes or visible lesions. No palpable masses, nipple discharge or adenopathy bilaterally.  PELVIC: Normal external female genitalia without lesions. Normal hair distribution. Adequate perineal body, normal urethral meatus. Urethra with no masses.  Bladder nontender. Vagina moist and well rugated without lesions or discharge. Cervix pink and without lesions. No significant cystocele or rectocele. Bimanual exam showed uterus normal size, shape, position, mobile and nontender. Adnexa without masses or tenderness. Urethra and bladder normal.   EXTREMITIES: No clubbing cyanosis or edema.    TIMEOUT PERFORMED  Patient identified, procedure confirmed, and allergies reviewed.     IUD REMOVAL:    Female patient  presents for IUD removal     PRE-IUD REMOVAL COUNSELING:  The patient was advised of minimal risks of bleeding and pain and she agrees to proceed.    PROCEDURE:  TIME OUT PERFORMED.  IUD strings were visualized at the os and grasped. IUD removed with gentle traction.  The patient tolerated the procedure well.    ASSESSMENT:  Contraceptive Management / Removal IUD. V25.0.    POST IUD REMOVAL COUNSELING:  Expect period-like flow to occur after Mirena IUD removal and periods to return to pre-IUD pattern.  Manage post IUD removal cramping with NSAIDs, Tylenol or Rx per [employer].    Counseling lasted approximately 15 minutes and all her questions were answered.    FOLLOW-UP: With me for annual gyn exam or prn.      ASSESSMENT/PLAN:  Gynecologic exam normal  -     Liquid-Based Pap Smear, Screening          Patient was counseled today on Pelvic exams and Pap Smear  guidelines.   We discussed STD screening if at high risk for a STD.  We discussed recommendation for breast cancer screening with mammogram every other year after the age of 40 and annually after the age of 50.    We discussed colon cancer screening when indicated.   Osteoporosis screening discussed when indicated.   She was advised to see her primary care physician for all other health maintenance.     FOLLOW-UP with me for next routine visit.

## 2023-03-13 ENCOUNTER — PATIENT MESSAGE (OUTPATIENT)
Dept: OBSTETRICS AND GYNECOLOGY | Facility: CLINIC | Age: 28
End: 2023-03-13
Payer: COMMERCIAL

## 2023-03-14 RX ORDER — FOLIC ACID 1 MG/1
1 TABLET ORAL DAILY
Qty: 90 TABLET | Refills: 3 | Status: SHIPPED | OUTPATIENT
Start: 2023-03-14 | End: 2023-07-31

## 2023-05-22 ENCOUNTER — OFFICE VISIT (OUTPATIENT)
Dept: DERMATOLOGY | Facility: CLINIC | Age: 28
End: 2023-05-22
Payer: COMMERCIAL

## 2023-05-22 DIAGNOSIS — L98.9 DERMATOSIS: ICD-10-CM

## 2023-05-22 DIAGNOSIS — H01.133 EYELID DERMATITIS, ECZEMATOUS, RIGHT: ICD-10-CM

## 2023-05-22 DIAGNOSIS — L20.89 OTHER ATOPIC DERMATITIS: Primary | ICD-10-CM

## 2023-05-22 DIAGNOSIS — H01.136 EYELID DERMATITIS, ECZEMATOUS, LEFT: ICD-10-CM

## 2023-05-22 DIAGNOSIS — L23.89 ALLERGIC CONTACT DERMATITIS DUE TO OTHER AGENTS: ICD-10-CM

## 2023-05-22 PROCEDURE — 1160F RVW MEDS BY RX/DR IN RCRD: CPT | Mod: CPTII,S$GLB,, | Performed by: DERMATOLOGY

## 2023-05-22 PROCEDURE — 88305 TISSUE EXAM BY PATHOLOGIST: CPT | Performed by: DERMATOLOGY

## 2023-05-22 PROCEDURE — 99214 PR OFFICE/OUTPT VISIT, EST, LEVL IV, 30-39 MIN: ICD-10-PCS | Mod: 25,S$GLB,, | Performed by: DERMATOLOGY

## 2023-05-22 PROCEDURE — 11105 PUNCH BX SKIN EA SEP/ADDL: CPT | Mod: S$GLB,,, | Performed by: DERMATOLOGY

## 2023-05-22 PROCEDURE — 1159F PR MEDICATION LIST DOCUMENTED IN MEDICAL RECORD: ICD-10-PCS | Mod: CPTII,S$GLB,, | Performed by: DERMATOLOGY

## 2023-05-22 PROCEDURE — 88312 SPECIAL STAINS GROUP 1: CPT | Mod: 59 | Performed by: DERMATOLOGY

## 2023-05-22 PROCEDURE — 1159F MED LIST DOCD IN RCRD: CPT | Mod: CPTII,S$GLB,, | Performed by: DERMATOLOGY

## 2023-05-22 PROCEDURE — 11104 PUNCH BX SKIN SINGLE LESION: CPT | Mod: S$GLB,,, | Performed by: DERMATOLOGY

## 2023-05-22 PROCEDURE — 88305 TISSUE EXAM BY PATHOLOGIST: CPT | Mod: 26,,, | Performed by: DERMATOLOGY

## 2023-05-22 PROCEDURE — 11105 PR PUNCH BIOPSY, SKIN, EA ADDTL LESION: ICD-10-PCS | Mod: S$GLB,,, | Performed by: DERMATOLOGY

## 2023-05-22 PROCEDURE — 11104 PR PUNCH BIOPSY, SKIN, SINGLE LESION: ICD-10-PCS | Mod: S$GLB,,, | Performed by: DERMATOLOGY

## 2023-05-22 PROCEDURE — 99214 OFFICE O/P EST MOD 30 MIN: CPT | Mod: 25,S$GLB,, | Performed by: DERMATOLOGY

## 2023-05-22 PROCEDURE — 1160F PR REVIEW ALL MEDS BY PRESCRIBER/CLIN PHARMACIST DOCUMENTED: ICD-10-PCS | Mod: CPTII,S$GLB,, | Performed by: DERMATOLOGY

## 2023-05-22 PROCEDURE — 88312 PR  SPECIAL STAINS,GROUP I: ICD-10-PCS | Mod: 26,,, | Performed by: DERMATOLOGY

## 2023-05-22 PROCEDURE — 99999 PR PBB SHADOW E&M-EST. PATIENT-LVL III: ICD-10-PCS | Mod: PBBFAC,,, | Performed by: DERMATOLOGY

## 2023-05-22 PROCEDURE — 99999 PR PBB SHADOW E&M-EST. PATIENT-LVL III: CPT | Mod: PBBFAC,,, | Performed by: DERMATOLOGY

## 2023-05-22 PROCEDURE — 88305 TISSUE EXAM BY PATHOLOGIST: ICD-10-PCS | Mod: 26,,, | Performed by: DERMATOLOGY

## 2023-05-22 PROCEDURE — 88312 SPECIAL STAINS GROUP 1: CPT | Mod: 26,,, | Performed by: DERMATOLOGY

## 2023-05-22 RX ORDER — TACROLIMUS 1 MG/G
OINTMENT TOPICAL
Qty: 60 G | Refills: 3 | Status: SHIPPED | OUTPATIENT
Start: 2023-05-22 | End: 2023-07-31

## 2023-05-22 NOTE — PATIENT INSTRUCTIONS
New Skin Care Regimen  Soap: Dove sensitive skin bar or liquid  Moisturizer: vanicream  Detergent: Tide Free, All Free, or Cheer Free   Fabric softener: do not use  Colognes/Perfumes/Fragrances: do not use  Bathing: shower or bathe with lukewarm water < 10 minutes      Punch Biopsy Wound Care    Your doctor has performed a punch biopsy today.  A band aid and antibiotic ointment has been placed over the site.  This should remain in place for 24 hours.  It is recommended that you keep the area dry for the first 24 hours.  After 24 hours, you may remove the band aid and wash the area with warm soap and water and apply Vaseline jelly.  Many patients prefer to use Neosporin or Bacitracin ointment.  This is acceptable; however know that you can develop an allergy to this medication even if you have used it safely for years.  It is important to keep the area moist.  Letting it dry out and get air slows healing time, will worsen the scar, and make it more difficult to remove the stitches if they were placed.  Band aid is optional after first 24 hours.      If you notice increasing redness, tenderness, pain, or yellow drainage at the biopsy or surgical site, please notify your doctor.  These are signs of an infection.    If your biopsy/surgical site is bleeding, apply firm pressure for 15 minutes straight.  Repeat for another 15 minutes, if it is still bleeding.   If the surgical site continues to bleed, then please contact your doctor.      [facility]   DERMATOLOGY  [address]   [place] [address]   Dept: [phone]   Dept Fax: [phone]

## 2023-05-22 NOTE — PROGRESS NOTES
Subjective:      Patient ID:  Phylicia Chamberlain is a 27 y.o. female who presents for   Chief Complaint   Patient presents with    Rash     Dry, red, flaky, raised areas above/under eyes, chin, and in random other placed. Reports they never go away.      Right lower thing. Red circular raised rash. Present for about a month.      Rash along trunk and breast that comes and goes. Has been seen about prior and was given a foam steroid. Reports it can itch. Comes and goes and will get inflamed.      Hx of seb derm and eczema, last seen on 1/18/23.  She c/o rash near the eyes x 1 year.  She is s/p desonide cream x 1 week without improvement. She also c/o rashes (possible yeast folliculitis) of the body x several years. Uses halobetasol foam with improvement but recurs.     Prior tx: fluconazole, ketoconazole shampoo, halobetasol foam, TAC cream      Review of Systems   Constitutional:  Negative for fever and chills.   Gastrointestinal:  Negative for nausea and vomiting.   Skin:  Positive for itching, rash and activity-related sunscreen use. Negative for daily sunscreen use and recent sunburn.   Hematologic/Lymphatic: Does not bruise/bleed easily.     Objective:   Physical Exam   Constitutional: She appears well-developed and well-nourished. No distress.   Neurological: She is alert and oriented to person, place, and time. She is not disoriented.   Psychiatric: She has a normal mood and affect.   Skin:   Areas Examined (abnormalities noted in diagram):   Head / Face Inspection Performed  Neck Inspection Performed  Chest / Axilla Inspection Performed  Abdomen Inspection Performed  Back Inspection Performed  RUE Inspected  LUE Inspection Performed  RLE Inspected  LLE Inspection Performed  Nails and Digits Inspection Performed               Diagram Legend     Erythematous scaling macule/papule c/w actinic keratosis       Vascular papule c/w angioma      Pigmented verrucoid papule/plaque c/w seborrheic keratosis       Yellow umbilicated papule c/w sebaceous hyperplasia      Irregularly shaped tan macule c/w lentigo     1-2 mm smooth white papules consistent with Milia      Movable subcutaneous cyst with punctum c/w epidermal inclusion cyst      Subcutaneous movable cyst c/w pilar cyst      Firm pink to brown papule c/w dermatofibroma      Pedunculated fleshy papule(s) c/w skin tag(s)      Evenly pigmented macule c/w junctional nevus     Mildly variegated pigmented, slightly irregular-bordered macule c/w mildly atypical nevus      Flesh colored to evenly pigmented papule c/w intradermal nevus       Pink pearly papule/plaque c/w basal cell carcinoma      Erythematous hyperkeratotic cursted plaque c/w SCC      Surgical scar with no sign of skin cancer recurrence      Open and closed comedones      Inflammatory papules and pustules      Verrucoid papule consistent consistent with wart     Erythematous eczematous patches and plaques     Dystrophic onycholytic nail with subungual debris c/w onychomycosis     Umbilicated papule    Erythematous-base heme-crusted tan verrucoid plaque consistent with inflamed seborrheic keratosis     Erythematous Silvery Scaling Plaque c/w Psoriasis     See annotation      Assessment / Plan:      Pathology Orders:       Normal Orders This Visit    Specimen to Pathology, Dermatology     Comments:    Number of Specimens:->2  ------------------------->-------------------------  Spec 1 Procedure:->Biopsy  Spec 1 Clinical Impression:->brittney's, 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 bacterial folliculitis vs. other  Spec 1 Source:->right back A  ------------------------->-------------------------  Spec 2 Procedure:->Biopsy  Spec 2 Clinical Impression:->brittney's, 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 bacterial folliculitis vs. other  Spec 2 Source:->right back B  Release to patient->Immediate    Questions:    Procedure Type: Dermatology and skin neoplasms    Number of Specimens: 2    ------------------------:  -------------------------    Spec 1 Procedure: Biopsy    Spec 1 Clinical Impression: brittney's, vs. pityrosporum folliculitis vs. bacterial folliculitis vs. other    Spec 1 Source: right back A    ------------------------: -------------------------    Spec 2 Procedure: Biopsy    Spec 2 Clinical Impression: brittney's, vs. pityrosporum folliculitis vs. bacterial folliculitis vs. other    Spec 2 Source: right back B    Clinical Information: see above    Release to patient: Immediate          Other atopic dermatitis  Eyelid dermatitis, eczematous, left  Eyelid dermatitis, eczematous, right  Allergic contact dermatitis due to other agents  -     tacrolimus (PROTOPIC) 0.1 % ointment; AAA bid prn eczema.  Non-steroid. Safe to use long-term  Dispense: 60 g; Refill: 3  -     we will start tacrolimus ointment twice a day for eyelid eczema.  Reviewed sensitive skin care.  Recommend avoidance of mascara and make ups to see if eczema will resolve.     Dermatosis  -     Specimen to Pathology, Dermatology  -     2 Punch biopsies done.  RTC in 3 weeks for results.           Follow up in about 3 weeks (around 6/12/2023).    PROCEDURE NOTE -- PUNCH BIOPSY  Location: see above    After risks, benefits, and alternatives were discussed with the patient, the patient agrees to the procedure by verbal consent. The area(s) is cleansed with alcohol. A total of 2 cc of lidocaine 1% with epinephrine and sodium bicarbonate was injected for local anesthesia. A 3 mm punch biopsy was used to remove part or all of the lesion(s). The specimen was sent for tissue pathology. The defect(s) was then packed with gelfoam. The area was dressed with antibiotic ointment and bandaged. The patient tolerated the procedure well without adverse events.  Wound care instructions were given to the patient on the AVS.  The patient will be notified of pathology results once available. Results will also be available in Epic.

## 2023-06-01 LAB
FINAL PATHOLOGIC DIAGNOSIS: NORMAL
GROSS: NORMAL
Lab: NORMAL
MICROSCOPIC EXAM: NORMAL

## 2023-06-05 ENCOUNTER — TELEPHONE (OUTPATIENT)
Dept: DERMATOLOGY | Facility: CLINIC | Age: 28
End: 2023-06-05
Payer: COMMERCIAL

## 2023-06-05 NOTE — TELEPHONE ENCOUNTER
Returned call. Pt notified of biopsy results and verbalized understanding.   ----- Message from Tad Borjas sent at 6/5/2023  3:50 PM CDT -----  Contact: Deanna  .Type:  Patient Returning Call    Who Called:Deanna  Who Left Message for Patient:Nurse  Does the patient know what this is regarding?:Yes  Would the patient rather a call back or a response via [facility]? Call Back  Best Call Back Number:[phone]  Additional Information: NA              Thanks  KT

## 2023-06-05 NOTE — TELEPHONE ENCOUNTER
Attempted to contact patient in regards to biopsy results. No answer. Message left.   ----- Message from Susie Lr MD sent at 6/1/2023 10:34 AM CDT -----  Please notify patient that biopsy showed folliculitis, with no evidence of bacterial infection.  This could represent follicular eczema.  Recommend patient start use of Protopic/tacrolimus ointment twice a day to the affected areas of the abdomen/back except for the biopsy site.  On the biopsy site, patient should continue to apply Vaseline until healed.  We will follow up with the patient on 06/19.

## 2023-07-31 ENCOUNTER — OFFICE VISIT (OUTPATIENT)
Dept: OBSTETRICS AND GYNECOLOGY | Facility: CLINIC | Age: 28
End: 2023-07-31
Payer: COMMERCIAL

## 2023-07-31 ENCOUNTER — LAB VISIT (OUTPATIENT)
Dept: LAB | Facility: HOSPITAL | Age: 28
End: 2023-07-31
Attending: OBSTETRICS & GYNECOLOGY
Payer: COMMERCIAL

## 2023-07-31 VITALS
WEIGHT: 167.56 LBS | BODY MASS INDEX: 25.39 KG/M2 | SYSTOLIC BLOOD PRESSURE: 116 MMHG | HEIGHT: 68 IN | DIASTOLIC BLOOD PRESSURE: 78 MMHG

## 2023-07-31 DIAGNOSIS — Z32.01 ENCOUNTER FOR PREGNANCY TEST, RESULT POSITIVE: Primary | ICD-10-CM

## 2023-07-31 DIAGNOSIS — Z32.01 ENCOUNTER FOR PREGNANCY TEST, RESULT POSITIVE: ICD-10-CM

## 2023-07-31 DIAGNOSIS — Z3A.09 9 WEEKS GESTATION OF PREGNANCY: ICD-10-CM

## 2023-07-31 LAB
B-HCG UR QL: POSITIVE
CTP QC/QA: YES

## 2023-07-31 PROCEDURE — 87340 HEPATITIS B SURFACE AG IA: CPT | Performed by: OBSTETRICS & GYNECOLOGY

## 2023-07-31 PROCEDURE — 76817 TRANSVAGINAL US OBSTETRIC: CPT | Mod: S$GLB,,, | Performed by: OBSTETRICS & GYNECOLOGY

## 2023-07-31 PROCEDURE — 3008F BODY MASS INDEX DOCD: CPT | Mod: CPTII,S$GLB,, | Performed by: OBSTETRICS & GYNECOLOGY

## 2023-07-31 PROCEDURE — 3008F PR BODY MASS INDEX (BMI) DOCUMENTED: ICD-10-PCS | Mod: CPTII,S$GLB,, | Performed by: OBSTETRICS & GYNECOLOGY

## 2023-07-31 PROCEDURE — 86592 SYPHILIS TEST NON-TREP QUAL: CPT | Performed by: OBSTETRICS & GYNECOLOGY

## 2023-07-31 PROCEDURE — 3074F PR MOST RECENT SYSTOLIC BLOOD PRESSURE < 130 MM HG: ICD-10-PCS | Mod: CPTII,S$GLB,, | Performed by: OBSTETRICS & GYNECOLOGY

## 2023-07-31 PROCEDURE — 99213 OFFICE O/P EST LOW 20 MIN: CPT | Mod: 25,S$GLB,, | Performed by: OBSTETRICS & GYNECOLOGY

## 2023-07-31 PROCEDURE — 86787 VARICELLA-ZOSTER ANTIBODY: CPT | Performed by: OBSTETRICS & GYNECOLOGY

## 2023-07-31 PROCEDURE — 76817 PR US, OB, TRANSVAG APPROACH: ICD-10-PCS | Mod: S$GLB,,, | Performed by: OBSTETRICS & GYNECOLOGY

## 2023-07-31 PROCEDURE — 85025 COMPLETE CBC W/AUTO DIFF WBC: CPT | Performed by: OBSTETRICS & GYNECOLOGY

## 2023-07-31 PROCEDURE — 86803 HEPATITIS C AB TEST: CPT | Performed by: OBSTETRICS & GYNECOLOGY

## 2023-07-31 PROCEDURE — 87086 URINE CULTURE/COLONY COUNT: CPT | Performed by: OBSTETRICS & GYNECOLOGY

## 2023-07-31 PROCEDURE — 86762 RUBELLA ANTIBODY: CPT | Performed by: OBSTETRICS & GYNECOLOGY

## 2023-07-31 PROCEDURE — 99213 PR OFFICE/OUTPT VISIT, EST, LEVL III, 20-29 MIN: ICD-10-PCS | Mod: 25,S$GLB,, | Performed by: OBSTETRICS & GYNECOLOGY

## 2023-07-31 PROCEDURE — 99999 PR PBB SHADOW E&M-EST. PATIENT-LVL III: CPT | Mod: PBBFAC,,, | Performed by: OBSTETRICS & GYNECOLOGY

## 2023-07-31 PROCEDURE — 99999 PR PBB SHADOW E&M-EST. PATIENT-LVL III: ICD-10-PCS | Mod: PBBFAC,,, | Performed by: OBSTETRICS & GYNECOLOGY

## 2023-07-31 PROCEDURE — 36415 COLL VENOUS BLD VENIPUNCTURE: CPT | Mod: PN | Performed by: OBSTETRICS & GYNECOLOGY

## 2023-07-31 PROCEDURE — 87661 TRICHOMONAS VAGINALIS AMPLIF: CPT | Performed by: OBSTETRICS & GYNECOLOGY

## 2023-07-31 PROCEDURE — 1159F MED LIST DOCD IN RCRD: CPT | Mod: CPTII,S$GLB,, | Performed by: OBSTETRICS & GYNECOLOGY

## 2023-07-31 PROCEDURE — 3074F SYST BP LT 130 MM HG: CPT | Mod: CPTII,S$GLB,, | Performed by: OBSTETRICS & GYNECOLOGY

## 2023-07-31 PROCEDURE — 81025 URINE PREGNANCY TEST: CPT | Mod: S$GLB,,, | Performed by: OBSTETRICS & GYNECOLOGY

## 2023-07-31 PROCEDURE — 87389 HIV-1 AG W/HIV-1&-2 AB AG IA: CPT | Performed by: OBSTETRICS & GYNECOLOGY

## 2023-07-31 PROCEDURE — 81025 POCT URINE PREGNANCY: ICD-10-PCS | Mod: S$GLB,,, | Performed by: OBSTETRICS & GYNECOLOGY

## 2023-07-31 PROCEDURE — 3078F PR MOST RECENT DIASTOLIC BLOOD PRESSURE < 80 MM HG: ICD-10-PCS | Mod: CPTII,S$GLB,, | Performed by: OBSTETRICS & GYNECOLOGY

## 2023-07-31 PROCEDURE — 3078F DIAST BP <80 MM HG: CPT | Mod: CPTII,S$GLB,, | Performed by: OBSTETRICS & GYNECOLOGY

## 2023-07-31 PROCEDURE — 1159F PR MEDICATION LIST DOCUMENTED IN MEDICAL RECORD: ICD-10-PCS | Mod: CPTII,S$GLB,, | Performed by: OBSTETRICS & GYNECOLOGY

## 2023-07-31 PROCEDURE — 87591 N.GONORRHOEAE DNA AMP PROB: CPT | Performed by: OBSTETRICS & GYNECOLOGY

## 2023-07-31 PROCEDURE — 86900 BLOOD TYPING SEROLOGIC ABO: CPT | Performed by: OBSTETRICS & GYNECOLOGY

## 2023-07-31 NOTE — PROCEDURES
Procedures    ULTRASOUND:   Bedside Ultrasound Findings    EXAMINATION:  US PELVIS COMP WITH TRANSVAG OB    CLINICAL HISTORY:  UPT positive    TECHNIQUE:  Transvaginal sonography    COMPARISON:  None    FINDINGS:  1. Uterus:    Appearance: Viable el intrauterine pregnancy was seen, yolk sac was seen. Crown-rump length = 29.7 mm with flicker, consistent with 9.6wga  and EDC 2/27/2024.        Impression      1. Single viable intrauterine pregnancy   2. Crown rump length consistent with 9.6wga, and estimated due date 2/27/2024    Final EDC: 2/27/2024 by 9wk US

## 2023-07-31 NOTE — PROGRESS NOTES
"Subjective:      Phylicia Chamberlain is being seen today for her first obstetrical visit.  She is at Unknown gestation.     Patient's last menstrual period was 2023 (approximate).  unsure  10.3 by her last menstrual period    FamHx: SB paternal aunt    Menstrual History:  OB History          0    Para   0    Term   0       0    AB   0    Living   0         SAB   0    IAB   0    Ectopic   0    Multiple   0    Live Births                      Risk factors: *Nulliparity    The following portions of the patient's history were reviewed and updated as appropriate: allergies, current medications, past family history, past medical history, past social history, past surgical history, and problem list.     Review of Systems  Constitutional: negative for chills and fatigue  Gastrointestinal: negative for abdominal pain, constipation, diarrhea, nausea and vomiting  Genitourinary:negative for abnormal menstrual periods, genital lesions and vaginal discharge, dysuria, frequency and hematuria     Objective:      /78   Ht 5' 8" (1.727 m)   Wt 76 kg (167 lb 8.8 oz)   LMP 2023 (Approximate)   BMI 25.48 kg/m²   General appearance: alert, appears stated age, and cooperative      ULTRASOUND:   Bedside Ultrasound Findings    EXAMINATION:  US PELVIS COMP WITH TRANSVAG OB    CLINICAL HISTORY:  UPT positive    TECHNIQUE:  Transvaginal sonography    COMPARISON:  None    FINDINGS:  1. Uterus:    Appearance: Viable el intrauterine pregnancy was seen, yolk sac was seen. Crown-rump length = 29.7 mm with flicker, consistent with 9.6wga  and EDC 2024.        Impression      1. Single viable intrauterine pregnancy   2. Crown rump length consistent with 9.6wga, and estimated due date 2024    Final EDC: 2024 by 9wk US    Assessment:      Pregnancy at Unknown       Plan:      Initial labs drawn.  Prenatal vitamins.  Problem list reviewed and updated.    Encounter for pregnancy test, " result positive  -     POCT urine pregnancy  -     Trichomonas vaginalis, RNA, Qual, Urine  -     C. trachomatis/N. gonorrhoeae by AMP DNA [facility]; Urine  -     Urine culture  -     Varicella Zoster Antibody, IgG; Future; Expected date: 07/31/2023  -     Type & Screen - Ob Profile; Future; Expected date: 07/31/2023  -     Rubella Antibody, IgG; Future; Expected date: 07/31/2023  -     RPR; Future; Expected date: 07/31/2023  -     HIV 1/2 Ag/Ab (4th Gen); Future; Expected date: 07/31/2023  -     Hepatitis B Surface Antigen; Future; Expected date: 07/31/2023  -     Hepatitis C Antibody; Future; Expected date: 07/31/2023  -     CBC Auto Differential; Future; Expected date: 07/31/2023    9 weeks gestation of pregnancy        Follow up in 4 weeks.    I reviewed today her blood pressure, weight gain, urine results and  fetal heart rate.  I have reviewed the previous labs and ultrasound with the patient.   I have answered questions to her satisfaction and total of 20 minutes spend today

## 2023-08-01 LAB
ABO + RH BLD: NORMAL
BASOPHILS # BLD AUTO: 0.02 K/UL (ref 0–0.2)
BASOPHILS NFR BLD: 0.4 % (ref 0–1.9)
BLD GP AB SCN CELLS X3 SERPL QL: NORMAL
C TRACH DNA SPEC QL NAA+PROBE: NOT DETECTED
DIFFERENTIAL METHOD: ABNORMAL
EOSINOPHIL # BLD AUTO: 0 K/UL (ref 0–0.5)
EOSINOPHIL NFR BLD: 0.5 % (ref 0–8)
ERYTHROCYTE [DISTWIDTH] IN BLOOD BY AUTOMATED COUNT: 12.6 % (ref 11.5–14.5)
HBV SURFACE AG SERPL QL IA: NORMAL
HCT VFR BLD AUTO: 37.7 % (ref 37–48.5)
HCV AB SERPL QL IA: NORMAL
HGB BLD-MCNC: 13.1 G/DL (ref 12–16)
HIV 1+2 AB+HIV1 P24 AG SERPL QL IA: NORMAL
IMM GRANULOCYTES # BLD AUTO: 0.01 K/UL (ref 0–0.04)
IMM GRANULOCYTES NFR BLD AUTO: 0.2 % (ref 0–0.5)
LYMPHOCYTES # BLD AUTO: 1.1 K/UL (ref 1–4.8)
LYMPHOCYTES NFR BLD: 19.1 % (ref 18–48)
MCH RBC QN AUTO: 32 PG (ref 27–31)
MCHC RBC AUTO-ENTMCNC: 34.7 G/DL (ref 32–36)
MCV RBC AUTO: 92 FL (ref 82–98)
MONOCYTES # BLD AUTO: 0.4 K/UL (ref 0.3–1)
MONOCYTES NFR BLD: 7 % (ref 4–15)
N GONORRHOEA DNA SPEC QL NAA+PROBE: NOT DETECTED
NEUTROPHILS # BLD AUTO: 4 K/UL (ref 1.8–7.7)
NEUTROPHILS NFR BLD: 72.8 % (ref 38–73)
NRBC BLD-RTO: 0 /100 WBC
PLATELET # BLD AUTO: 229 K/UL (ref 150–450)
PMV BLD AUTO: 10.4 FL (ref 9.2–12.9)
RBC # BLD AUTO: 4.09 M/UL (ref 4–5.4)
RPR SER QL: NORMAL
RUBV IGG SER-ACNC: 83.1 IU/ML
RUBV IGG SER-IMP: REACTIVE
VARICELLA INTERPRETATION: POSITIVE
VARICELLA ZOSTER IGG: 470.8 AU/ML
WBC # BLD AUTO: 5.54 K/UL (ref 3.9–12.7)

## 2023-08-02 LAB — BACTERIA UR CULT: NO GROWTH

## 2023-08-03 LAB
SPECIMEN SOURCE: NORMAL
T VAGINALIS RRNA SPEC QL NAA+PROBE: NEGATIVE

## 2023-08-29 ENCOUNTER — ROUTINE PRENATAL (OUTPATIENT)
Dept: OBSTETRICS AND GYNECOLOGY | Facility: CLINIC | Age: 28
End: 2023-08-29
Payer: COMMERCIAL

## 2023-08-29 ENCOUNTER — PATIENT MESSAGE (OUTPATIENT)
Dept: ADMINISTRATIVE | Facility: OTHER | Age: 28
End: 2023-08-29
Payer: COMMERCIAL

## 2023-08-29 VITALS
SYSTOLIC BLOOD PRESSURE: 102 MMHG | WEIGHT: 162.94 LBS | BODY MASS INDEX: 24.77 KG/M2 | DIASTOLIC BLOOD PRESSURE: 68 MMHG

## 2023-08-29 DIAGNOSIS — Z34.02 ENCOUNTER FOR SUPERVISION OF NORMAL FIRST PREGNANCY IN SECOND TRIMESTER: Primary | ICD-10-CM

## 2023-08-29 DIAGNOSIS — Z3A.14 14 WEEKS GESTATION OF PREGNANCY: ICD-10-CM

## 2023-08-29 LAB
BILIRUBIN, UA POC OHS: NEGATIVE
BLOOD, UA POC OHS: NEGATIVE
CLARITY, UA POC OHS: ABNORMAL
COLOR, UA POC OHS: YELLOW
GLUCOSE, UA POC OHS: NEGATIVE
KETONES, UA POC OHS: NEGATIVE
LEUKOCYTES, UA POC OHS: NEGATIVE
NITRITE, UA POC OHS: NEGATIVE
PH, UA POC OHS: 7.5
PROTEIN, UA POC OHS: NEGATIVE
SPECIFIC GRAVITY, UA POC OHS: 1.02
UROBILINOGEN, UA POC OHS: 0.2

## 2023-08-29 PROCEDURE — 0502F PR SUBSEQUENT PRENATAL CARE: ICD-10-PCS | Mod: CPTII,S$GLB,, | Performed by: OBSTETRICS & GYNECOLOGY

## 2023-08-29 PROCEDURE — 0502F SUBSEQUENT PRENATAL CARE: CPT | Mod: CPTII,S$GLB,, | Performed by: OBSTETRICS & GYNECOLOGY

## 2023-08-29 PROCEDURE — 99999 PR PBB SHADOW E&M-EST. PATIENT-LVL III: ICD-10-PCS | Mod: PBBFAC,,, | Performed by: OBSTETRICS & GYNECOLOGY

## 2023-08-29 PROCEDURE — 99999 PR PBB SHADOW E&M-EST. PATIENT-LVL III: CPT | Mod: PBBFAC,,, | Performed by: OBSTETRICS & GYNECOLOGY

## 2023-08-29 NOTE — PROGRESS NOTES
Subjective:      Phylicia Chamberlain is a 28 y.o. female being seen today for her obstetrical visit. She is at 14w0d gestation. Patient reports no complaints.     OB ROS: no vaginal bleeding, no leakage of fluid, no contractions/cramping, no vaginal discharge. Fetal movement: too early.    Menstrual History:  OB History          2    Para   0    Term   0       0    AB   0    Living   0         SAB   0    IAB   0    Ectopic   0    Multiple   0    Live Births                    Patient's last menstrual period was 2023 (approximate).       Objective:      /68   Wt 73.9 kg (162 lb 14.7 oz)   LMP 2023 (Approximate)   BMI 24.77 kg/m²     Vitals  BP: 102/68  Weight: 73.9 kg (162 lb 14.7 oz)  Prenatal  Fetal Heart Rate: 153  Movement: Absent  Fundal height not measured         Prenatal Labs:  Lab Results   Component Value Date    GROUPTRH A POS 2023    HGB 13.1 2023    HCT 37.7 2023     2023    RUBELLAIMMUN Reactive 2023    HEPBSAG Non-reactive 2023    [id number] Non-reactive 2023    RPR Non-reactive 2023    LABCHLA Not Detected 2023    LABNGO Not Detected 2023    LABURIN No growth 2023       Assessment:      Pregnancy 14w0d      Plan:     Final EDC 2023  BMI 25: [id number]# gain  Genetic: unsure  N/V: improved          Encounter for supervision of normal first pregnancy in second trimester  -     US OB 14+ Wks TransAbd & TransVag, Single Gestation (XPD); Future; Expected date: 2023  -     Connected MOM Enrollment  -     Assign Connected MOM Program Consent Questionnaire    14 weeks gestation of pregnancy  -     POCT Urinalysis(Instrument)        Follow up in 4 weeks.       I reviewed today her blood pressure, weight gain, urine results and  fetal heart rate.  I have reviewed the previous labs and ultrasound with the patient.   I have answered questions to her satisfaction and total of 20  minutes spend today

## 2023-09-12 ENCOUNTER — PATIENT MESSAGE (OUTPATIENT)
Dept: OTHER | Facility: OTHER | Age: 28
End: 2023-09-12
Payer: COMMERCIAL

## 2023-09-19 ENCOUNTER — PATIENT MESSAGE (OUTPATIENT)
Dept: OTHER | Facility: OTHER | Age: 28
End: 2023-09-19
Payer: COMMERCIAL

## 2023-09-28 ENCOUNTER — ROUTINE PRENATAL (OUTPATIENT)
Dept: OBSTETRICS AND GYNECOLOGY | Facility: CLINIC | Age: 28
End: 2023-09-28
Payer: COMMERCIAL

## 2023-09-28 ENCOUNTER — HOSPITAL ENCOUNTER (OUTPATIENT)
Dept: RADIOLOGY | Facility: HOSPITAL | Age: 28
Discharge: HOME | End: 2023-09-28
Attending: OBSTETRICS & GYNECOLOGY
Payer: COMMERCIAL

## 2023-09-28 VITALS
SYSTOLIC BLOOD PRESSURE: 122 MMHG | WEIGHT: 167.13 LBS | DIASTOLIC BLOOD PRESSURE: 72 MMHG | BODY MASS INDEX: 25.41 KG/M2

## 2023-09-28 DIAGNOSIS — Z34.02 ENCOUNTER FOR SUPERVISION OF NORMAL FIRST PREGNANCY IN SECOND TRIMESTER: ICD-10-CM

## 2023-09-28 DIAGNOSIS — Z3A.18 18 WEEKS GESTATION OF PREGNANCY: ICD-10-CM

## 2023-09-28 DIAGNOSIS — O28.3 ABNORMAL FETAL ULTRASOUND: Primary | ICD-10-CM

## 2023-09-28 LAB
BILIRUBIN, UA POC OHS: NEGATIVE
BLOOD, UA POC OHS: NEGATIVE
CLARITY, UA POC OHS: CLEAR
COLOR, UA POC OHS: YELLOW
GLUCOSE, UA POC OHS: NEGATIVE
KETONES, UA POC OHS: NEGATIVE
LEUKOCYTES, UA POC OHS: ABNORMAL
NITRITE, UA POC OHS: NEGATIVE
PH, UA POC OHS: 6.5
PROTEIN, UA POC OHS: NEGATIVE
SPECIFIC GRAVITY, UA POC OHS: 1.01
UROBILINOGEN, UA POC OHS: 0.2

## 2023-09-28 PROCEDURE — 0502F PR SUBSEQUENT PRENATAL CARE: ICD-10-PCS | Mod: CPTII,S$GLB,, | Performed by: OBSTETRICS & GYNECOLOGY

## 2023-09-28 PROCEDURE — 99999 PR PBB SHADOW E&M-EST. PATIENT-LVL III: CPT | Mod: PBBFAC,,, | Performed by: OBSTETRICS & GYNECOLOGY

## 2023-09-28 PROCEDURE — 99999 PR PBB SHADOW E&M-EST. PATIENT-LVL III: ICD-10-PCS | Mod: PBBFAC,,, | Performed by: OBSTETRICS & GYNECOLOGY

## 2023-09-28 PROCEDURE — 76805 OB US >/= 14 WKS SNGL FETUS: CPT | Mod: TC,PN

## 2023-09-28 PROCEDURE — 76805 US OB 14+ WEEKS, TRANSABDOM, SINGLE GESTATION: ICD-10-PCS | Mod: 26,,, | Performed by: RADIOLOGY

## 2023-09-28 PROCEDURE — 0502F SUBSEQUENT PRENATAL CARE: CPT | Mod: CPTII,S$GLB,, | Performed by: OBSTETRICS & GYNECOLOGY

## 2023-09-28 PROCEDURE — 76805 OB US >/= 14 WKS SNGL FETUS: CPT | Mod: 26,,, | Performed by: RADIOLOGY

## 2023-09-28 NOTE — PROGRESS NOTES
Subjective:      Phylicia Chamberlain is a 28 y.o. female being seen today for her obstetrical visit. She is at 18w2d gestation. Patient reports no complaints.     OB ROS: no vaginal bleeding, no leakage of fluid, no contractions/cramping, no vaginal discharge. Fetal movement: too early.    Menstrual History:  OB History          1    Para   0    Term   0       0    AB   0    Living   0         SAB   0    IAB   0    Ectopic   0    Multiple   0    Live Births                    Patient's last menstrual period was 2023 (approximate).       Objective:      /72   Wt 75.8 kg (167 lb 1.7 oz)   LMP 2023 (Approximate)   BMI 25.41 kg/m²     Vitals  BP: 122/72  Weight: 75.8 kg (167 lb 1.7 oz)  Prenatal  Fetal Heart Rate: 152  Movement: Absent  Fundal height not measured         Prenatal Labs:  Lab Results   Component Value Date    GROUPTRH A POS 2023    HGB 13.1 2023    HCT 37.7 2023     2023    RUBELLAIMMUN Reactive 2023    HEPBSAG Non-reactive 2023    [id number] Non-reactive 2023    RPR Non-reactive 2023    LABCHLA Not Detected 2023    LABNGO Not Detected 2023    LABURIN No growth 2023       Assessment:      Pregnancy 18w2d      Plan:     Final EDC 2023  BMI 25: [id number]# gain  Genetic: Quad 2023  N/V: improved          Abnormal fetal ultrasound  -     US OB Limited 1 Or More Gestations; Future; Expected date: 10/28/2023    18 weeks gestation of pregnancy  -     POCT Urinalysis(Instrument)        Follow up in 4 weeks.       I reviewed today her blood pressure, weight gain, urine results and  fetal heart rate.  I have reviewed the previous labs and ultrasound with the patient.   I have answered questions to her satisfaction and total of 20 minutes spend today

## 2023-09-29 ENCOUNTER — LAB VISIT (OUTPATIENT)
Dept: LAB | Facility: HOSPITAL | Age: 28
End: 2023-09-29
Attending: OBSTETRICS & GYNECOLOGY
Payer: COMMERCIAL

## 2023-09-29 DIAGNOSIS — O28.3 ABNORMAL FETAL ULTRASOUND: ICD-10-CM

## 2023-09-29 PROCEDURE — 81511 FTL CGEN ABNOR FOUR ANAL: CPT | Performed by: OBSTETRICS & GYNECOLOGY

## 2023-09-29 PROCEDURE — 36415 COLL VENOUS BLD VENIPUNCTURE: CPT | Mod: PN | Performed by: OBSTETRICS & GYNECOLOGY

## 2023-10-02 LAB
# FETUSES US: NORMAL
2ND TRIMESTER 4 SCREEN PNL SERPL: NEGATIVE
2ND TRIMESTER 4 SCREEN SERPL-IMP: NORMAL
AFP MOM SERPL: 0.89
AFP SERPL-MCNC: 40.6 NG/ML
AGE AT DELIVERY: 29
B-HCG MOM SERPL: 2.26
B-HCG SERPL-ACNC: 35.6 IU/ML
FET TS 21 RISK FROM MAT AGE: NORMAL
GA (DAYS): 2 D
GA (WEEKS): 18 WK
GA METHOD: NORMAL
IDDM PATIENT QL: NORMAL
INHIBIN A MOM SERPL: 0.87
INHIBIN A SERPL-MCNC: 189.4 PG/ML
SMOKING STATUS FTND: YES
TS 18 RISK FETUS: NORMAL
TS 21 RISK FETUS: NORMAL
U ESTRIOL MOM SERPL: 0.77
U ESTRIOL SERPL-MCNC: 1.16 NG/ML

## 2023-10-10 ENCOUNTER — PATIENT MESSAGE (OUTPATIENT)
Dept: OTHER | Facility: OTHER | Age: 28
End: 2023-10-10
Payer: COMMERCIAL

## 2023-10-24 ENCOUNTER — HOSPITAL ENCOUNTER (OUTPATIENT)
Dept: RADIOLOGY | Facility: HOSPITAL | Age: 28
Discharge: HOME | End: 2023-10-24
Attending: OBSTETRICS & GYNECOLOGY
Payer: COMMERCIAL

## 2023-10-24 ENCOUNTER — ROUTINE PRENATAL (OUTPATIENT)
Dept: OBSTETRICS AND GYNECOLOGY | Facility: CLINIC | Age: 28
End: 2023-10-24
Payer: COMMERCIAL

## 2023-10-24 VITALS
BODY MASS INDEX: 26.21 KG/M2 | WEIGHT: 172.38 LBS | SYSTOLIC BLOOD PRESSURE: 124 MMHG | DIASTOLIC BLOOD PRESSURE: 70 MMHG

## 2023-10-24 DIAGNOSIS — O28.3 ABNORMAL FETAL ULTRASOUND: ICD-10-CM

## 2023-10-24 DIAGNOSIS — Z34.02 ENCOUNTER FOR SUPERVISION OF NORMAL FIRST PREGNANCY IN SECOND TRIMESTER: Primary | ICD-10-CM

## 2023-10-24 DIAGNOSIS — Z3A.22 22 WEEKS GESTATION OF PREGNANCY: ICD-10-CM

## 2023-10-24 LAB
BILIRUBIN, UA POC OHS: NEGATIVE
BLOOD, UA POC OHS: ABNORMAL
CLARITY, UA POC OHS: CLEAR
COLOR, UA POC OHS: YELLOW
GLUCOSE, UA POC OHS: NEGATIVE
KETONES, UA POC OHS: NEGATIVE
LEUKOCYTES, UA POC OHS: ABNORMAL
NITRITE, UA POC OHS: NEGATIVE
PH, UA POC OHS: 6
PROTEIN, UA POC OHS: NEGATIVE
SPECIFIC GRAVITY, UA POC OHS: 1.01
UROBILINOGEN, UA POC OHS: 0.2

## 2023-10-24 PROCEDURE — 76815 OB US LIMITED FETUS(S): CPT | Mod: TC,PN

## 2023-10-24 PROCEDURE — 0502F PR SUBSEQUENT PRENATAL CARE: ICD-10-PCS | Mod: CPTII,S$GLB,, | Performed by: OBSTETRICS & GYNECOLOGY

## 2023-10-24 PROCEDURE — 0502F SUBSEQUENT PRENATAL CARE: CPT | Mod: CPTII,S$GLB,, | Performed by: OBSTETRICS & GYNECOLOGY

## 2023-10-24 PROCEDURE — 99999 PR PBB SHADOW E&M-EST. PATIENT-LVL II: ICD-10-PCS | Mod: PBBFAC,,, | Performed by: OBSTETRICS & GYNECOLOGY

## 2023-10-24 PROCEDURE — 76815 US OB LIMITED 1 OR MORE GESTATIONS: ICD-10-PCS | Mod: 26,,, | Performed by: RADIOLOGY

## 2023-10-24 PROCEDURE — 99999 PR PBB SHADOW E&M-EST. PATIENT-LVL II: CPT | Mod: PBBFAC,,, | Performed by: OBSTETRICS & GYNECOLOGY

## 2023-10-24 PROCEDURE — 76815 OB US LIMITED FETUS(S): CPT | Mod: 26,,, | Performed by: RADIOLOGY

## 2023-10-24 NOTE — PROGRESS NOTES
Subjective:      Phylicia Chamberlain is a 28 y.o. female being seen today for her obstetrical visit. She is at 22w0d gestation. Patient reports no complaints.     OB ROS: no vaginal bleeding, no leakage of fluid, no contractions/cramping, no vaginal discharge. Fetal movement: normal.    Menstrual History:  OB History          1    Para   0    Term   0       0    AB   0    Living   0         SAB   0    IAB   0    Ectopic   0    Multiple   0    Live Births                    Patient's last menstrual period was 2023 (approximate).       Objective:      /70   Wt 78.2 kg (172 lb 6.4 oz)   LMP 2023 (Approximate)   BMI 26.21 kg/m²     Vitals  BP: 124/70  Weight: 78.2 kg (172 lb 6.4 oz)  Prenatal  Fetal Heart Rate: 143  Movement: Present  Fundal height not measured         Prenatal Labs:  Lab Results   Component Value Date    GROUPTRH A POS 2023    HGB 13.1 2023    HCT 37.7 2023     2023    RUBELLAIMMUN Reactive 2023    HEPBSAG Non-reactive 2023    [id number] Non-reactive 2023    RPR Non-reactive 2023    LABCHLA Not Detected 2023    LABNGO Not Detected 2023    LABURIN No growth 2023    QUADSCREEN Negative 2023       Assessment:      Pregnancy 22w0d      Plan:     Final EDC 2023  BMI 25: [id number]# gain  Genetic: Quad 2023, surprise  N/V: improved  Peds: Temptlet  Epidural  Breastfeeding, has pump  Contraception: condoms          Encounter for supervision of normal first pregnancy in second trimester  -     POCT Urinalysis(Instrument)  -     OB Glucose Screen; Future; Expected date: 10/24/2023    22 weeks gestation of pregnancy        Follow up in 4 weeks.       I reviewed today her blood pressure, weight gain, urine results and  fetal heart rate.  I have reviewed the previous labs and ultrasound with the patient.   I have answered questions to her satisfaction and total of 20 minutes  spend today

## 2023-11-07 ENCOUNTER — PATIENT MESSAGE (OUTPATIENT)
Dept: OTHER | Facility: OTHER | Age: 28
End: 2023-11-07
Payer: COMMERCIAL

## 2023-11-27 ENCOUNTER — LAB VISIT (OUTPATIENT)
Dept: LAB | Facility: HOSPITAL | Age: 28
End: 2023-11-27
Attending: OBSTETRICS & GYNECOLOGY
Payer: COMMERCIAL

## 2023-11-27 ENCOUNTER — ROUTINE PRENATAL (OUTPATIENT)
Dept: OBSTETRICS AND GYNECOLOGY | Facility: CLINIC | Age: 28
End: 2023-11-27
Payer: COMMERCIAL

## 2023-11-27 VITALS — DIASTOLIC BLOOD PRESSURE: 74 MMHG | SYSTOLIC BLOOD PRESSURE: 120 MMHG

## 2023-11-27 DIAGNOSIS — Z3A.26 26 WEEKS GESTATION OF PREGNANCY: ICD-10-CM

## 2023-11-27 DIAGNOSIS — Z34.02 ENCOUNTER FOR SUPERVISION OF NORMAL FIRST PREGNANCY IN SECOND TRIMESTER: ICD-10-CM

## 2023-11-27 DIAGNOSIS — F41.1 GAD (GENERALIZED ANXIETY DISORDER): Chronic | ICD-10-CM

## 2023-11-27 DIAGNOSIS — Z34.02 ENCOUNTER FOR SUPERVISION OF NORMAL FIRST PREGNANCY IN SECOND TRIMESTER: Primary | ICD-10-CM

## 2023-11-27 LAB
BILIRUBIN, UA POC OHS: NEGATIVE
BLOOD, UA POC OHS: NEGATIVE
CLARITY, UA POC OHS: CLEAR
COLOR, UA POC OHS: YELLOW
GLUCOSE SERPL-MCNC: 108 MG/DL (ref 70–140)
GLUCOSE, UA POC OHS: NEGATIVE
KETONES, UA POC OHS: NEGATIVE
LEUKOCYTES, UA POC OHS: ABNORMAL
NITRITE, UA POC OHS: NEGATIVE
PH, UA POC OHS: 7
PROTEIN, UA POC OHS: NEGATIVE
SPECIFIC GRAVITY, UA POC OHS: 1.01
UROBILINOGEN, UA POC OHS: 0.2

## 2023-11-27 PROCEDURE — 99999 PR PBB SHADOW E&M-EST. PATIENT-LVL II: CPT | Mod: PBBFAC,,, | Performed by: OBSTETRICS & GYNECOLOGY

## 2023-11-27 PROCEDURE — 36415 COLL VENOUS BLD VENIPUNCTURE: CPT | Mod: PN | Performed by: OBSTETRICS & GYNECOLOGY

## 2023-11-27 PROCEDURE — 0502F SUBSEQUENT PRENATAL CARE: CPT | Mod: CPTII,S$GLB,, | Performed by: OBSTETRICS & GYNECOLOGY

## 2023-11-27 PROCEDURE — 99999 PR PBB SHADOW E&M-EST. PATIENT-LVL II: ICD-10-PCS | Mod: PBBFAC,,, | Performed by: OBSTETRICS & GYNECOLOGY

## 2023-11-27 PROCEDURE — 0502F PR SUBSEQUENT PRENATAL CARE: ICD-10-PCS | Mod: CPTII,S$GLB,, | Performed by: OBSTETRICS & GYNECOLOGY

## 2023-11-27 PROCEDURE — 87086 URINE CULTURE/COLONY COUNT: CPT | Performed by: OBSTETRICS & GYNECOLOGY

## 2023-11-27 PROCEDURE — 82950 GLUCOSE TEST: CPT | Performed by: OBSTETRICS & GYNECOLOGY

## 2023-11-27 NOTE — PROGRESS NOTES
Subjective:      Phylicia Chamberlain is a 28 y.o. female being seen today for her obstetrical visit. She is at 26w6d gestation. Patient reports  round lig pain .     OB ROS: no vaginal bleeding, no leakage of fluid, no contractions/cramping, no vaginal discharge. Fetal movement: normal.    Menstrual History:  OB History          1    Para   0    Term   0       0    AB   0    Living   0         SAB   0    IAB   0    Ectopic   0    Multiple   0    Live Births                    Patient's last menstrual period was 2023 (approximate).       Objective:      /74   LMP 2023 (Approximate)     Vitals  BP: 120/74  Prenatal  Fetal Heart Rate: 152  Movement: Present  Fundal height size equal to dates  2.449 kg (5 lb 6.4 oz)           Prenatal Labs:  Lab Results   Component Value Date    GROUPTRH A POS 2023    HGB 13.1 2023    HCT 37.7 2023     2023    RUBELLAIMMUN Reactive 2023    HEPBSAG Non-reactive 2023    [id number] Non-reactive 2023    RPR Non-reactive 2023    LABCHLA Not Detected 2023    LABNGO Not Detected 2023    LABURIN No growth 2023    QUADSCREEN Negative 2023       Assessment:      Pregnancy 26w6d      Plan:     Final EDC 2023  BMI 25: recommended 15-25# gain  Genetic: Quad 2023, surprise  N/V: improved  Peds: Temptlet  Epidural  Breastfeeding, has pump  Contraception: condoms        1. Encounter for supervision of normal first pregnancy in second trimester  -     POCT Urinalysis(Instrument)    2. 26 weeks gestation of pregnancy    3. ANABEL (generalized anxiety disorder)         Follow up in 4 weeks.       I reviewed today her blood pressure, weight gain, urine results and  fetal heart rate.  I have reviewed the previous labs and ultrasound with the patient.   I have answered questions to her satisfaction and total of 20 minutes spend today

## 2023-11-29 LAB — BACTERIA UR CULT: NORMAL

## 2023-12-05 ENCOUNTER — PATIENT MESSAGE (OUTPATIENT)
Dept: OTHER | Facility: OTHER | Age: 28
End: 2023-12-05
Payer: COMMERCIAL

## 2023-12-19 ENCOUNTER — PATIENT MESSAGE (OUTPATIENT)
Dept: OTHER | Facility: OTHER | Age: 28
End: 2023-12-19
Payer: COMMERCIAL

## 2023-12-27 ENCOUNTER — ROUTINE PRENATAL (OUTPATIENT)
Dept: OBSTETRICS AND GYNECOLOGY | Facility: CLINIC | Age: 28
End: 2023-12-27
Payer: COMMERCIAL

## 2023-12-27 VITALS
BODY MASS INDEX: 28.49 KG/M2 | WEIGHT: 187.38 LBS | SYSTOLIC BLOOD PRESSURE: 110 MMHG | DIASTOLIC BLOOD PRESSURE: 74 MMHG

## 2023-12-27 DIAGNOSIS — Z34.02 ENCOUNTER FOR SUPERVISION OF NORMAL FIRST PREGNANCY IN SECOND TRIMESTER: Primary | ICD-10-CM

## 2023-12-27 DIAGNOSIS — Z3A.31 31 WEEKS GESTATION OF PREGNANCY: ICD-10-CM

## 2023-12-27 LAB
BILIRUBIN, UA POC OHS: NEGATIVE
BLOOD, UA POC OHS: NEGATIVE
CLARITY, UA POC OHS: CLEAR
COLOR, UA POC OHS: YELLOW
GLUCOSE, UA POC OHS: NEGATIVE
KETONES, UA POC OHS: NEGATIVE
LEUKOCYTES, UA POC OHS: ABNORMAL
NITRITE, UA POC OHS: NEGATIVE
PH, UA POC OHS: 7
PROTEIN, UA POC OHS: NEGATIVE
SPECIFIC GRAVITY, UA POC OHS: 1.01
UROBILINOGEN, UA POC OHS: 0.2

## 2023-12-27 PROCEDURE — 99999 PR PBB SHADOW E&M-EST. PATIENT-LVL II: ICD-10-PCS | Mod: PBBFAC,,, | Performed by: OBSTETRICS & GYNECOLOGY

## 2023-12-27 PROCEDURE — 0502F SUBSEQUENT PRENATAL CARE: CPT | Mod: CPTII,S$GLB,, | Performed by: OBSTETRICS & GYNECOLOGY

## 2023-12-27 PROCEDURE — 99999 PR PBB SHADOW E&M-EST. PATIENT-LVL II: CPT | Mod: PBBFAC,,, | Performed by: OBSTETRICS & GYNECOLOGY

## 2023-12-27 PROCEDURE — 0502F PR SUBSEQUENT PRENATAL CARE: ICD-10-PCS | Mod: CPTII,S$GLB,, | Performed by: OBSTETRICS & GYNECOLOGY

## 2023-12-27 NOTE — PROGRESS NOTES
Subjective:      Phylicia Chamberlian is a 28 y.o. female being seen today for her obstetrical visit. She is at 31w1d gestation. Patient reports  round lig pain .     OB ROS: no vaginal bleeding, no leakage of fluid, no contractions/cramping, no vaginal discharge. Fetal movement: normal.    Menstrual History:  OB History          1    Para   0    Term   0       0    AB   0    Living   0         SAB   0    IAB   0    Ectopic   0    Multiple   0    Live Births                    Patient's last menstrual period was 2023 (approximate).       Objective:      /74   Wt 85 kg (187 lb 6.3 oz)   LMP 2023 (Approximate)   BMI 28.49 kg/m²     Vitals  BP: 110/74  Weight: 85 kg (187 lb 6.3 oz)  Prenatal  Fetal Heart Rate: 138  Movement: Present  Fundal height size equal to dates  9.249 kg (20 lb 6.3 oz)           Prenatal Labs:  Lab Results   Component Value Date    GROUPTRH A POS 2023    HGB 13.1 2023    HCT 37.7 2023     2023    RUBELLAIMMUN Reactive 2023    HEPBSAG Non-reactive 2023    [id number] Non-reactive 2023    RPR Non-reactive 2023    LABCHLA Not Detected 2023    LABNGO Not Detected 2023    LABURIN No significant growth 2023    QUADSCREEN Negative 2023    OBGLUCOSESCR 108 2023       Assessment:      Pregnancy 31w1d      Plan:     Final EDC 2023  BMI 25: recommended 15-25# gain  Genetic: Quad 2023, surprise  N/V: improved  Peds: Temptlet  Epidural  Breastfeeding, has pump  Contraception: condoms        1. Encounter for supervision of normal first pregnancy in second trimester  -     POCT Urinalysis(Instrument)  -     CBC Auto Differential; Future; Expected date: 2023    2. 31 weeks gestation of pregnancy         Follow up in 2 weeks.       I reviewed today her blood pressure, weight gain, urine results and  fetal heart rate.  I have reviewed the previous labs and ultrasound with  the patient.   I have answered questions to her satisfaction and total of 20 minutes spend today

## 2023-12-29 DIAGNOSIS — Z34.02 ENCOUNTER FOR SUPERVISION OF NORMAL FIRST PREGNANCY IN SECOND TRIMESTER: Primary | ICD-10-CM

## 2024-01-02 ENCOUNTER — PATIENT MESSAGE (OUTPATIENT)
Dept: OTHER | Facility: OTHER | Age: 29
End: 2024-01-02
Payer: COMMERCIAL

## 2024-01-11 ENCOUNTER — ROUTINE PRENATAL (OUTPATIENT)
Dept: OBSTETRICS AND GYNECOLOGY | Facility: CLINIC | Age: 29
End: 2024-01-11
Payer: COMMERCIAL

## 2024-01-11 ENCOUNTER — LAB VISIT (OUTPATIENT)
Dept: LAB | Facility: HOSPITAL | Age: 29
End: 2024-01-11
Attending: OBSTETRICS & GYNECOLOGY
Payer: COMMERCIAL

## 2024-01-11 VITALS
SYSTOLIC BLOOD PRESSURE: 110 MMHG | BODY MASS INDEX: 28.93 KG/M2 | WEIGHT: 190.25 LBS | DIASTOLIC BLOOD PRESSURE: 84 MMHG

## 2024-01-11 DIAGNOSIS — F41.1 GAD (GENERALIZED ANXIETY DISORDER): Chronic | ICD-10-CM

## 2024-01-11 DIAGNOSIS — Z34.03 ENCOUNTER FOR SUPERVISION OF NORMAL FIRST PREGNANCY IN THIRD TRIMESTER: Primary | ICD-10-CM

## 2024-01-11 DIAGNOSIS — Z3A.33 33 WEEKS GESTATION OF PREGNANCY: ICD-10-CM

## 2024-01-11 DIAGNOSIS — Z34.02 ENCOUNTER FOR SUPERVISION OF NORMAL FIRST PREGNANCY IN SECOND TRIMESTER: ICD-10-CM

## 2024-01-11 LAB
BASOPHILS # BLD AUTO: 0.02 K/UL (ref 0–0.2)
BASOPHILS NFR BLD: 0.3 % (ref 0–1.9)
BILIRUBIN, UA POC OHS: NEGATIVE
BLOOD, UA POC OHS: NEGATIVE
CLARITY, UA POC OHS: CLEAR
COLOR, UA POC OHS: YELLOW
DIFFERENTIAL METHOD BLD: ABNORMAL
EOSINOPHIL # BLD AUTO: 0.1 K/UL (ref 0–0.5)
EOSINOPHIL NFR BLD: 0.9 % (ref 0–8)
ERYTHROCYTE [DISTWIDTH] IN BLOOD BY AUTOMATED COUNT: 12.8 % (ref 11.5–14.5)
GLUCOSE, UA POC OHS: NEGATIVE
HCT VFR BLD AUTO: 36.5 % (ref 37–48.5)
HGB BLD-MCNC: 13.1 G/DL (ref 12–16)
IMM GRANULOCYTES # BLD AUTO: 0.03 K/UL (ref 0–0.04)
IMM GRANULOCYTES NFR BLD AUTO: 0.4 % (ref 0–0.5)
KETONES, UA POC OHS: NEGATIVE
LEUKOCYTES, UA POC OHS: ABNORMAL
LYMPHOCYTES # BLD AUTO: 1.4 K/UL (ref 1–4.8)
LYMPHOCYTES NFR BLD: 19.9 % (ref 18–48)
MCH RBC QN AUTO: 33.9 PG (ref 27–31)
MCHC RBC AUTO-ENTMCNC: 35.9 G/DL (ref 32–36)
MCV RBC AUTO: 95 FL (ref 82–98)
MONOCYTES # BLD AUTO: 0.7 K/UL (ref 0.3–1)
MONOCYTES NFR BLD: 10.5 % (ref 4–15)
NEUTROPHILS # BLD AUTO: 4.6 K/UL (ref 1.8–7.7)
NEUTROPHILS NFR BLD: 68 % (ref 38–73)
NITRITE, UA POC OHS: NEGATIVE
NRBC BLD-RTO: 0 /100 WBC
PH, UA POC OHS: 6
PLATELET # BLD AUTO: 258 K/UL (ref 150–450)
PMV BLD AUTO: 10.2 FL (ref 9.2–12.9)
PROTEIN, UA POC OHS: NEGATIVE
RBC # BLD AUTO: 3.86 M/UL (ref 4–5.4)
SPECIFIC GRAVITY, UA POC OHS: <=1.005
UROBILINOGEN, UA POC OHS: 0.2
WBC # BLD AUTO: 6.79 K/UL (ref 3.9–12.7)

## 2024-01-11 PROCEDURE — 99999 PR PBB SHADOW E&M-EST. PATIENT-LVL II: CPT | Mod: PBBFAC,,, | Performed by: OBSTETRICS & GYNECOLOGY

## 2024-01-11 PROCEDURE — 36415 COLL VENOUS BLD VENIPUNCTURE: CPT | Mod: PN | Performed by: OBSTETRICS & GYNECOLOGY

## 2024-01-11 PROCEDURE — 85025 COMPLETE CBC W/AUTO DIFF WBC: CPT | Performed by: OBSTETRICS & GYNECOLOGY

## 2024-01-11 PROCEDURE — 0502F SUBSEQUENT PRENATAL CARE: CPT | Mod: CPTII,S$GLB,, | Performed by: OBSTETRICS & GYNECOLOGY

## 2024-01-11 NOTE — PROGRESS NOTES
Subjective:      Phylicia Chamberlain is a 28 y.o. female being seen today for her obstetrical visit. She is at 33w2d gestation. Patient reports no complaints.     OB ROS: no vaginal bleeding, no leakage of fluid, no contractions/cramping, no vaginal discharge. Fetal movement: normal.    Menstrual History:  OB History          1    Para   0    Term   0       0    AB   0    Living   0         SAB   0    IAB   0    Ectopic   0    Multiple   0    Live Births                    Patient's last menstrual period was 2023 (approximate).       Objective:      /84   Wt 86.3 kg (190 lb 4.1 oz)   LMP 2023 (Approximate)   BMI 28.93 kg/m²     Vitals  BP: 110/84  Weight: 86.3 kg (190 lb 4.1 oz)  Prenatal  Fetal Heart Rate: 140  Movement: Present  Fundal height size equal to dates  10.5 kg (23 lb 4.1 oz)           Prenatal Labs:  Lab Results   Component Value Date    GROUPTRH A POS 2023    HGB 13.1 2023    HCT 37.7 2023     2023    RUBELLAIMMUN Reactive 2023    HEPBSAG Non-reactive 2023    [id number] Non-reactive 2023    RPR Non-reactive 2023    LABCHLA Not Detected 2023    LABNGO Not Detected 2023    LABURIN No significant growth 2023    QUADSCREEN Negative 2023    OBGLUCOSESCR 108 2023       Assessment:      Pregnancy 33w2d      Plan:     Final EDC 2023  Vaccines: Declined COVID, Tdap, flu, & RSV  BMI 25: recommended 15-25# gain  Genetic: Quad 2023, surprise  N/V: improved  Peds: Temptlet  Epidural  Breastfeeding, has pump  Contraception: condoms        1. Encounter for supervision of normal first pregnancy in third trimester    2. 33 weeks gestation of pregnancy  -     POCT Urinalysis(Instrument)    3. ANABEL (generalized anxiety disorder)         Follow up in 2 weeks.       I reviewed today her blood pressure, weight gain, urine results and  fetal heart rate.  I have reviewed the previous labs  and ultrasound with the patient.   I have answered questions to her satisfaction and total of 20 minutes spend today

## 2024-01-23 ENCOUNTER — PATIENT MESSAGE (OUTPATIENT)
Dept: OTHER | Facility: OTHER | Age: 29
End: 2024-01-23
Payer: COMMERCIAL

## 2024-01-24 ENCOUNTER — HOSPITAL ENCOUNTER (OUTPATIENT)
Dept: RADIOLOGY | Facility: HOSPITAL | Age: 29
Discharge: HOME | End: 2024-01-24
Attending: OBSTETRICS & GYNECOLOGY
Payer: COMMERCIAL

## 2024-01-24 ENCOUNTER — ROUTINE PRENATAL (OUTPATIENT)
Dept: OBSTETRICS AND GYNECOLOGY | Facility: CLINIC | Age: 29
End: 2024-01-24
Payer: COMMERCIAL

## 2024-01-24 VITALS — WEIGHT: 192 LBS | DIASTOLIC BLOOD PRESSURE: 70 MMHG | SYSTOLIC BLOOD PRESSURE: 114 MMHG | BODY MASS INDEX: 29.2 KG/M2

## 2024-01-24 DIAGNOSIS — Z34.03 ENCOUNTER FOR SUPERVISION OF NORMAL FIRST PREGNANCY IN THIRD TRIMESTER: Primary | ICD-10-CM

## 2024-01-24 DIAGNOSIS — O26.843 UTERINE SIZE DATE DISCREPANCY PREGNANCY, THIRD TRIMESTER: ICD-10-CM

## 2024-01-24 DIAGNOSIS — M25.511 ACUTE PAIN OF RIGHT SHOULDER: ICD-10-CM

## 2024-01-24 DIAGNOSIS — Z3A.35 35 WEEKS GESTATION OF PREGNANCY: ICD-10-CM

## 2024-01-24 LAB
BILIRUBIN, UA POC OHS: NEGATIVE
BLOOD, UA POC OHS: NEGATIVE
CLARITY, UA POC OHS: CLEAR
COLOR, UA POC OHS: YELLOW
GLUCOSE, UA POC OHS: NEGATIVE
KETONES, UA POC OHS: NEGATIVE
LEUKOCYTES, UA POC OHS: ABNORMAL
NITRITE, UA POC OHS: NEGATIVE
PH, UA POC OHS: 7
PROTEIN, UA POC OHS: NEGATIVE
SPECIFIC GRAVITY, UA POC OHS: 1.02
UROBILINOGEN, UA POC OHS: 0.2

## 2024-01-24 PROCEDURE — 0502F SUBSEQUENT PRENATAL CARE: CPT | Mod: CPTII,S$GLB,, | Performed by: OBSTETRICS & GYNECOLOGY

## 2024-01-24 PROCEDURE — 76815 OB US LIMITED FETUS(S): CPT | Mod: TC,PN

## 2024-01-24 PROCEDURE — 99999 PR PBB SHADOW E&M-EST. PATIENT-LVL III: CPT | Mod: PBBFAC,,, | Performed by: OBSTETRICS & GYNECOLOGY

## 2024-01-24 PROCEDURE — 76815 OB US LIMITED FETUS(S): CPT | Mod: 26,,, | Performed by: RADIOLOGY

## 2024-01-31 ENCOUNTER — ROUTINE PRENATAL (OUTPATIENT)
Dept: OBSTETRICS AND GYNECOLOGY | Facility: CLINIC | Age: 29
End: 2024-01-31
Payer: COMMERCIAL

## 2024-01-31 VITALS
BODY MASS INDEX: 30.03 KG/M2 | DIASTOLIC BLOOD PRESSURE: 72 MMHG | SYSTOLIC BLOOD PRESSURE: 116 MMHG | WEIGHT: 197.56 LBS

## 2024-01-31 DIAGNOSIS — Z3A.36 36 WEEKS GESTATION OF PREGNANCY: ICD-10-CM

## 2024-01-31 DIAGNOSIS — Z34.03 ENCOUNTER FOR SUPERVISION OF NORMAL FIRST PREGNANCY IN THIRD TRIMESTER: Primary | ICD-10-CM

## 2024-01-31 PROCEDURE — 0502F SUBSEQUENT PRENATAL CARE: CPT | Mod: CPTII,S$GLB,, | Performed by: OBSTETRICS & GYNECOLOGY

## 2024-01-31 PROCEDURE — 99999 PR PBB SHADOW E&M-EST. PATIENT-LVL II: CPT | Mod: PBBFAC,,, | Performed by: OBSTETRICS & GYNECOLOGY

## 2024-01-31 PROCEDURE — 87081 CULTURE SCREEN ONLY: CPT | Performed by: OBSTETRICS & GYNECOLOGY

## 2024-01-31 NOTE — PROGRESS NOTES
Subjective:      Phylicia Chamberlain is a 28 y.o. female being seen today for her obstetrical visit. She is at 36w1d gestation. Patient reports no complaints.     OB ROS: no vaginal bleeding, no leakage of fluid, no contractions/cramping, no vaginal discharge. Fetal movement: normal.    Menstrual History:  OB History          1    Para   0    Term   0       0    AB   0    Living   0         SAB   0    IAB   0    Ectopic   0    Multiple   0    Live Births                    Patient's last menstrual period was 2023 (approximate).       Objective:      /72   Wt 89.6 kg (197 lb 8.5 oz)   LMP 2023 (Approximate)   BMI 30.03 kg/m²     Vitals  BP: 116/72  Weight: 89.6 kg (197 lb 8.5 oz)  Prenatal  Fetal Heart Rate: 143  Movement: Present    13.8 kg (30 lb 8.5 oz)    Declined cervical check       Prenatal Labs:  Lab Results   Component Value Date    GROUPTRH A POS 2023    HGB 13.1 2024    HCT 36.5 (L) 2024     2024    RUBELLAIMMUN Reactive 2023    HEPBSAG Non-reactive 2023    [id number] Non-reactive 2023    RPR Non-reactive 2023    LABCHLA Not Detected 2023    LABNGO Not Detected 2023    LABURIN No significant growth 2023    QUADSCREEN Negative 2023    OBGLUCOSESCR 108 2023       Assessment:      Pregnancy 36w1d      Plan:     Final EDC 2023  Vaccines: Declined COVID, Tdap, flu, & RSV  BMI 25: recommended 15-25# gain  Genetic: Quad 2023, surprise  N/V: improved  Peds: Temptlet  Epidural  Breastfeeding, has pump  Contraception: condoms  Growth ultrasound: 35.1wga, EFW 2681g, 56%, AC 69%, SHAINA 17, VTX, post placenta  Declined induction of labor at 39 weeks, desires 41wk induction of labor.       1. Encounter for supervision of normal first pregnancy in third trimester  -     POCT Urinalysis(Instrument)  -     Strep B Screen, Vaginal / Rectal; Future; Expected date: 2024    2. 36  weeks gestation of pregnancy         Follow up in 1 weeks.       I reviewed today her blood pressure, weight gain, urine results and  fetal heart rate.  I have reviewed the previous labs and ultrasound with the patient.   I have answered questions to her satisfaction and total of 20 minutes spend today

## 2024-02-03 LAB — BACTERIA SPEC AEROBE CULT: NORMAL

## 2024-02-05 ENCOUNTER — CLINICAL SUPPORT (OUTPATIENT)
Dept: REHABILITATION | Facility: HOSPITAL | Age: 29
End: 2024-02-05
Attending: OBSTETRICS & GYNECOLOGY
Payer: COMMERCIAL

## 2024-02-05 DIAGNOSIS — M25.69 BACK STIFFNESS: ICD-10-CM

## 2024-02-05 DIAGNOSIS — M89.8X1 PAIN IN SCAPULA: Primary | ICD-10-CM

## 2024-02-05 DIAGNOSIS — M25.511 ACUTE PAIN OF RIGHT SHOULDER: ICD-10-CM

## 2024-02-05 DIAGNOSIS — M53.80 BACK TIGHTNESS: ICD-10-CM

## 2024-02-05 PROCEDURE — 97110 THERAPEUTIC EXERCISES: CPT | Mod: PN

## 2024-02-05 PROCEDURE — 97140 MANUAL THERAPY 1/> REGIONS: CPT | Mod: PN

## 2024-02-05 PROCEDURE — 97530 THERAPEUTIC ACTIVITIES: CPT | Mod: PN

## 2024-02-05 PROCEDURE — 97162 PT EVAL MOD COMPLEX 30 MIN: CPT | Mod: PN

## 2024-02-06 NOTE — PLAN OF CARE
[facility]   Physical Therapy Initial Evaluation     Date: 2/5/2024   Name: Phylicia Felton [facility]  Clinic Number: [id number]    Therapy Diagnosis:   Encounter Diagnoses   Name Primary?    Acute pain of right shoulder     Pain in scapula Yes    Back stiffness     Back tightness      Physician: Abena Bah MD    Physician Orders: PT Eval and Treat   Medical Diagnosis from Referral: Acute pain of right shoulder [M25.511]   Evaluation Date: 2/5/2024  Authorization Period Expiration: 1/23/2025  Plan of Care Expiration: 4/26/2024  Progress Note Due: 3/1/2024  Visit # / Visits authorized: 1 / 1   FOTO: Issued Visit #: 1/3, (capture on visit 1, 5, 10) first on 2/5/2024    Precautions: Standard, 3rd trimester (37 weeks at time of Initial Evaluation)    Time In: 1:45  Time Out: 2:20  Total Appointment Time (timed & untimed codes): 55 minutes      SUBJECTIVE   Date of onset: 2 months    History of current condition - Deanna reports: she has been having ~ 2 months of Right periscapular muscle pain that starts along the medial border of Right scapula.  So far does not notice a lot of restrictions with Right shoulder use or ROM.  She does not report any history of neck trauma.  Similar sensation previously when she would position her arms to hold the bar for back squats (she would feel a similar c/o at her R scapula and maybe into shoulder but nothing into Right Upper Extremity).      She states the c/o in Right scapula region feels random.  Some times she can feel the c/o with writing with Right Upper Extremity.  States she is likely to be in a slouched position while writing.    Describes pain as sharp, shooting, stinging, feels like the area is tying to fall asleep.     Falls: none    Imaging, none:     Prior Therapy: yes  Social History: Deanna reports she lives in 1-story with .   Occupation: train dogs  Prior Level of Function: no previous issues she can recall  Current Level of  Function: prolonged sitting    Pain:  Current 0/10, worst 7/10, best 0/10   Location: Right medial scapula border down to mid lumbar region   Description: Aching, Burning, Grabbing, Tight, Numb, and Shooting  Aggravating Factors: Sitting  Easing Factors: stand up and walk around, massage, stretch    Patients goals: feel better before giving birth and having to care for baby     Medical History:   No past medical history on file.    Surgical History:   Phylicia Felton Klein Bulmaro  has a past surgical history that includes Anterior cruciate ligament repair (2010).    Medications:   Phylicia has a current medication list which includes the following prescription(s): prenatal 105/iron/folic ac/dha.    Allergies:   Review of patient's allergies indicates:   Allergen Reactions    Sulfa (sulfonamide antibiotics)      Other reaction(s): Hives    Sulfamethoxazole      Other reaction(s): Hives    Trimethoprim      Other reaction(s): Hives        OBJECTIVE     Posture/alignment:    Sitting with Right scapula mildly elevated and protracted vs Left side.  Fair thoracic positioning.  Mild forward head posture.    Standing:   with Right scapula and iliac crest resting inferior vs Left side.    Supine:  Right sided pelvic dowsnlip with AI.  ~ 6mm Right leg length discrepancy.    Cervical ROM:  WFL, tight with rotation    Sensation:  light touch intact Bilateral Upper Extremity     Cervical Radiculopathy    Left  Right   Spurling's Test (A-sidebend) negative negative     Distraction Test negative   negative     MNTT negative   negative     UNTT negative   negative     RNTT negative   negative         Shoulder AROM :   Left  Right    Flexion  157   155    Abduction  175   170        Shoulder Strength:   Left  Right    Flexion  5/5 5/5   Scaption 5/5 5/5   Abduction 5/5 5/5   External Rotation 5/5 5/5   Internal Rotation 5/5 5/5      Palpation:  Increased tone and tenderness to palpation noted Right Quadratus Lumborum and Lower  Trapezius.      Scap-thoracic:  decreased Right scapula rotation with overhead shoulder ROM.    Special Test Clusters:    Rotator Cuff    Left  Right   Drop Arm Sign negative negative   Painful Arc negative negative    Shoulder impingement negative negative       Limitation/Restriction for FOTO DASH Survey    Therapist reviewed FOTO scores for Phylicia Chamberlain on 2/5/2024.   FOTO documents entered into [facility] [facility] - see Media section.    Intake Score: 65%.  Predicted 76%.         TREATMENT     Total Treatment time (time-based codes) separate from Evaluation: 30 minutes      Deanna received the treatments listed below:      manual therapy techniques: for 10 minutes, including:  Sidelying opening technique (pillow roll at hip) with myofascial release and STM to Right Quadratus Lumborum and Lower Trapezius   Muscle energy technique correction of Right sided pelvic downslip with AI    therapeutic exercises for 10 minutes including:  Self correction of Right sided pelvic downslip with AI  Sidelying opening stretch to Right Quadratus Lumborum  Bent over modified prayer stretch to open Right side of trunk    neuromuscular re-education activities for 00 minutes. The following activities were included:      therapeutic activities for 10  minutes, including:  Eval findings with education and demonstration regarding posture, postural awareness, sitting ergonomics, ergonomics with driving, joint and soft tissue deficits, body mechanics, arthrokinematics, diagnosis, functional limitations related to deficits and diagnosis.     Self correction of Right sided pelvic dowsnlip with AI.  Sidelying opening technique to Right Quadratus Lumborum with pillow roll at side  Modified prayer stretch opening Right side of trunk    Sidelying Right hip/pelvic hike 3 x 10  Sidelying Right pelvic retraction 3 x 10  Sidelying Right hip/pelvic hike + AAROM LLR 3 x 10      Plan for Next Visit:  Assess presentation to clinic and patients response  to the last visit following IE, manual therapy intervention, therapeutic exercises, pt education, and HEP.  Manual therapy to address alignment, mobility, flexibility, tenderness, soft tissue restrictions and/or presence of trigger points.  Add stretching, stabilization, mobilization and strengthening exercises as appropriate.  Modalitiesif indicated.      PATIENT EDUCATION AND HOME EXERCISES     Education provided:   PT provided education and demonstration of HEP to include:   Eval findings with education and demonstration regarding posture, postural awareness, sitting ergonomics, ergonomics with driving, joint and soft tissue deficits, body mechanics, arthrokinematics, diagnosis, functional limitations related to deficits and diagnosis.     Self correction of Right sided pelvic dowsnlip with AI.  Sidelying opening technique to Right Quadratus Lumborum with pillow roll at side  Modified prayer stretch opening Right side of trunk.      Use of Right heel lift  Pt was instructed to perform these daily.  Pt was given instructions to stop use of HEP if there are any adverse reactions/responses and f/u with PT next treatment to discuss.    Home Exercises Provided:  Exercises were reviewed and Deanna was able to demonstrate them prior to the end of the session.  Deanna demonstrated fair  understanding of the education provided.   See EMR under Patient Instructions for exercises provided during therapy sessions.    ASSESSMENT     Phylicia is a 28 y.o. female referred to outpatient Physical Therapy with a medical diagnosis of Acute pain of right shoulder [M25.511] .   Patient presents with increased c/o Right periscapula pain for the past 2 months that she feels from Right medial scapula border down her Right side to about her mid lumbar region.  She presents with fairly symmetrical shoulder ROM and strength testing but has increased tone and tenderness to palpation Right lower trap.  More obvious was her malalignment with  increased Right Quadratus Lumborum tightness with Right side shift and hiking of her pelvis.  Tenderness to palpation and increased muscle tone was more pronounced along Right side paravertebral muscles around TLJ segments.    Patient prognosis is Good.   Patient will benefit from skilled outpatient Physical Therapy to address the deficits stated above and in the chart below, provide patient /family education, and to maximize patientt's level of independence.     Plan of care discussed with patient: Yes  Patient's spiritual, cultural and educational needs considered and patient is agreeable to the plan of care and goals as stated below:     Anticipated Barriers for therapy: Schedule conflicts, transportation, pain, guarding, tolerance, endurance, posture, postural awareness, joint and soft tissue deficits, 3rd trimester of pregnancy    Medical Necessity is demonstrated by the following  History  Co-morbidities and personal factors that may impact the plan of care [] LOW: no personal factors / co-morbidities  [x] MODERATE: 1-2 personal factors / co-morbidities  [] HIGH: 3+ personal factors / co-morbidities    Moderate / High Support Documentation:   Co-morbidities affecting plan of care: See PMHx    Personal Factors:   lifestyle     Examination  Body Structures and Functions, activity limitations and participation restrictions that may impact the plan of care [] LOW: addressing 1-2 elements  [] MODERATE: 3+ elements  [] HIGH: 4+ elements (please support below)    Moderate / High Support Documentation: pain, ROM, weakness, postural awareness, joint and soft tissue deficits, limitations due to 3rd trimester of pregnancy     Clinical Presentation [] LOW: stable  [x] MODERATE: Evolving  [] HIGH: Unstable     Decision Making/ Complexity Score: moderate       Goals:  Short-Term: 4 weeks (3/1/2024)  Pt will improve bilateralActive shouder flexion to > or = 160* to promote return to prior functional status.  Pt will improve  bilateralActive shouder abduction to > or = 160* to promote return to prior functional status.  Pt will improve trunk alignment with decreased Right Quadratus Lumborum spasming allowing more symmetrical pelvic and lumbar alignment.  Pt will improve flexibility of Right periscapular and lumbar muscles to help promoted better mobility, posture, alignment, and help return to prior functional status.  Pt will verbalize understanding and independence with good sitting posture and ergonomics.  Pt will decrease pain levels < or = to 4-5/10 to help promote appropriate progression of PT, HEP, and ADL's    Pt will be compliant with new home exercise program to assist with PT intervention and help allow appropriate progression through plan of care.    Long-Term: 12 weeks (4/26/2024)  Pt will improve bilateral shoulder flexion/abduction AROM > or = 170 to allow return to normal activities of daily living.  Pt will improve bilateral shoulder strength to > or = 5/5 to allow performance of activities of daily living.  Pt will improve trunk and scap-thoracic strength/stabilization to > or = to GOOD to allow better posture, alignment, and help return to prior functional status.  Pt will have > or = to GOOD flexibility of her shoulder, periscapular and trunk muscles to help promoted better mobility, posture, alignment, and help return to prior functional status.  Pt will demonstrate GOOD postural habits to improve QOL and allow return to prior functional status.  Pt will report < or = 2/10 pain during activities of daily living to improve QOL and allow return to prior functional status.   Pt will be compliant and independent with HEP to allow and maintain better participation in normal activities  Pt will be able to resume normals ADL's, IADL's, previous activities, fitness, and work related tasks     PLAN   Plan of care Certification: 2/5/2024 to 4/26/2024.      Outpatient Physical Therapy 1-2 times weekly for 12 weeks to include  the following interventions: Electrical Stimulation attended/unattended, Gait Training, Manual Therapy, Moist Heat/ Ice, Neuromuscular Re-ed, Orthotic Management and Training, Patient Education, Therapeutic Activities, Therapeutic Exercise, dry needling and Ultrasound.       Jimbo Gutierrez, PT      I CERTIFY THE NEED FOR THESE SERVICES FURNISHED UNDER THIS PLAN OF TREATMENT AND WHILE UNDER MY CARE   Physician's comments:     Physician's Signature: ___________________________________________________

## 2024-02-06 NOTE — PATIENT INSTRUCTIONS
Education provided:   PT provided education and demonstration of HEP to include:   Eval findings with education and demonstration regarding posture, postural awareness, sitting ergonomics, ergonomics with driving, joint and soft tissue deficits, body mechanics, arthrokinematics, diagnosis, functional limitations related to deficits and diagnosis.     Self correction of Right sided pelvic dowsnlip with AI.  Sidelying opening technique to Right Quadratus Lumborum with pillow roll at side  Modified prayer stretch opening Right side of trunk.      Use of Right heel lift  Pt was instructed to perform these daily.  Pt was given instructions to stop use of HEP if there are any adverse reactions/responses and f/u with PT next treatment to discuss.    Home Exercises Provided:  Exercises were reviewed and Deanna was able to demonstrate them prior to the end of the session.  Deanna demonstrated fair  understanding of the education provided.   See EMR under Patient Instructions for exercises provided during therapy sessions.

## 2024-02-07 ENCOUNTER — CLINICAL SUPPORT (OUTPATIENT)
Dept: REHABILITATION | Facility: HOSPITAL | Age: 29
End: 2024-02-07
Payer: COMMERCIAL

## 2024-02-07 ENCOUNTER — ROUTINE PRENATAL (OUTPATIENT)
Dept: OBSTETRICS AND GYNECOLOGY | Facility: CLINIC | Age: 29
End: 2024-02-07
Payer: COMMERCIAL

## 2024-02-07 VITALS
DIASTOLIC BLOOD PRESSURE: 64 MMHG | BODY MASS INDEX: 30.03 KG/M2 | WEIGHT: 197.56 LBS | SYSTOLIC BLOOD PRESSURE: 110 MMHG

## 2024-02-07 DIAGNOSIS — M53.80 BACK TIGHTNESS: ICD-10-CM

## 2024-02-07 DIAGNOSIS — M25.69 BACK STIFFNESS: ICD-10-CM

## 2024-02-07 DIAGNOSIS — M89.8X1 PAIN IN SCAPULA: Primary | ICD-10-CM

## 2024-02-07 DIAGNOSIS — O26.843 UTERINE SIZE DATE DISCREPANCY PREGNANCY, THIRD TRIMESTER: ICD-10-CM

## 2024-02-07 DIAGNOSIS — Z3A.37 37 WEEKS GESTATION OF PREGNANCY: ICD-10-CM

## 2024-02-07 DIAGNOSIS — Z34.03 ENCOUNTER FOR SUPERVISION OF NORMAL FIRST PREGNANCY IN THIRD TRIMESTER: Primary | ICD-10-CM

## 2024-02-07 PROCEDURE — 99999 PR PBB SHADOW E&M-EST. PATIENT-LVL III: CPT | Mod: PBBFAC,,, | Performed by: OBSTETRICS & GYNECOLOGY

## 2024-02-07 PROCEDURE — 97140 MANUAL THERAPY 1/> REGIONS: CPT | Mod: PN,CQ

## 2024-02-07 PROCEDURE — 97112 NEUROMUSCULAR REEDUCATION: CPT | Mod: PN,CQ

## 2024-02-07 PROCEDURE — 97110 THERAPEUTIC EXERCISES: CPT | Mod: PN,CQ

## 2024-02-07 PROCEDURE — 0502F SUBSEQUENT PRENATAL CARE: CPT | Mod: CPTII,S$GLB,, | Performed by: OBSTETRICS & GYNECOLOGY

## 2024-02-07 NOTE — PROGRESS NOTES
Subjective:      Phylicia Chamberlain is a 28 y.o. female being seen today for her obstetrical visit. She is at 37w1d gestation. Patient reports no complaints.     OB ROS: no vaginal bleeding, no leakage of fluid, no contractions/cramping, no vaginal discharge. Fetal movement: normal.    Menstrual History:  OB History          1    Para   0    Term   0       0    AB   0    Living   0         SAB   0    IAB   0    Ectopic   0    Multiple   0    Live Births                    Patient's last menstrual period was 2023 (approximate).       Objective:      /64   Wt 89.6 kg (197 lb 8.5 oz)   LMP 2023 (Approximate)   BMI 30.03 kg/m²     Vitals  BP: 110/64  Weight: 89.6 kg (197 lb 8.5 oz)  Prenatal  Fetal Heart Rate: 137  Movement: Present  Fundal height  size less than dates  13.8 kg (30 lb 8.5 oz)      Prenatal Labs:  Lab Results   Component Value Date    GROUPTRH A POS 2023    HGB 13.1 2024    HCT 36.5 (L) 2024     2024    RUBELLAIMMUN Reactive 2023    HEPBSAG Non-reactive 2023    [id number] Non-reactive 2023    RPR Non-reactive 2023    LABCHLA Not Detected 2023    LABNGO Not Detected 2023    LABURIN No significant growth 2023    QUADSCREEN Negative 2023    OBGLUCOSESCR 108 2023    STREPBCULT No Group B Streptococcus isolated 2024       Assessment:      Pregnancy 37w1d      Plan:     Final EDC 2023  GBS neg  Vaccines: Declined COVID, Tdap, flu, & RSV  BMI 25: recommended 15-25# gain  Genetic: Quad 2023, surprise  Peds: Temptlet  Epidural  Breastfeeding, has pump  Contraception: condoms  Growth ultrasound: 35.1wga, EFW 2681g, 56%, AC 69%, SHAINA 17, VTX, post placenta        1. Encounter for supervision of normal first pregnancy in third trimester  -     POCT Urinalysis(Instrument)    2. 37 weeks gestation of pregnancy    3. Uterine size date discrepancy pregnancy, third  trimester  -     US OB Limited 1 Or More Gestations; Future; Expected date: 03/07/2024         Follow up in 1 weeks.       I reviewed today her blood pressure, weight gain, urine results and  fetal heart rate.  I have reviewed the previous labs and ultrasound with the patient.   I have answered questions to her satisfaction and total of 20 minutes spend today

## 2024-02-07 NOTE — PROGRESS NOTES
"[facility]   Physical Therapy Treatment Note      Name: Phylicia Felton [facility]  Clinic Number: [id number]    Therapy Diagnosis:   Encounter Diagnoses   Name Primary?    Pain in scapula Yes    Back stiffness     Back tightness      Physician: Abena Bah MD    Visit Date: 2/7/2024    Physician Orders: PT Eval and Treat   Medical Diagnosis from Referral: Acute pain of right shoulder [M25.511]   Evaluation Date: 2/5/2024  Authorization Period Expiration: 12/31/2024  Plan of Care Expiration: 4/26/2024  Progress Note Due: 3/1/2024  Visit # / Visits authorized: 1 / 20 +eval   FOTO: Issued Visit #: 1/3, (capture on visit 1, 5, 10) first on 2/5/2024     Precautions: Standard, 3rd trimester (37 weeks at time of Initial Evaluation)     Time In: 1613   Time Out: 1659   Total Time: 46 minutes  Total Billable Time: 46 minutes    PTA Visit #: 1/5       Subjective     Patient reports: shoulder blade still a problem but midback is feeling better this week. Pain last night was 6/10. Pain at present <1/10. "I just know it's there." Unsure of technique with modified prayer stretch.   .  She was compliant with home exercise program.  Response to previous treatment: improving pain  Functional change: none stated    Pain: <1/10  Location: right inferior scapular border.      Objective      37 weeks gestation 2/7/2024    Posture/alignment:    Sitting with Right scapula mildly elevated and protracted vs Left side.  Fair thoracic positioning.  Mild forward head posture.     Standing:   with Right scapula and iliac crest resting inferior vs Left side.     Supine:  Right sided pelvic dowsnlip with AI.  ~ 6mm Right leg length discrepancy.    Treatment     Deanna received the treatments listed below:      manual therapy techniques: for 15 minutes, including:  Sidelying opening technique (pillow roll at hip) with myofascial release and STM to Right Quadratus Lumborum and Lower Trapezius, right UT  Muscle " energy technique correction of Right sided pelvic downslip with AI     therapeutic exercises for 10 minutes including:  Self correction of Right sided pelvic downslip with AI  Sidelying opening stretch to Right Quadratus Lumborum  Bent over modified prayer stretch to open Right side of trunk 3 x 10s-trial of variations of this stretch to reduce strain through shoulders  Posterior shoulder rolls x 20     neuromuscular re-education activities for 21 minutes. The following activities were included:  Self correction of Right sided pelvic dowsnlip with AI.  Sidelying opening technique to Right Quadratus Lumborum with pillow roll at side  NP-Modified prayer stretch opening Right side of trunk  Sidelying Right hip/pelvic hike 3 x 10  Sidelying Right pelvic retraction 3 x 10  Sidelying Right hip/pelvic hike + AAROM LLR 3 x 10  Scap retraction x 10-stopped 2* right UT discomfort  No money x 5-stopped 2* right UT discomfort.          therapeutic activities for 00 minutes, including:  Eval findings with education and demonstration regarding posture, postural awareness, sitting ergonomics, ergonomics with driving, joint and soft tissue deficits, body mechanics, arthrokinematics, diagnosis, functional limitations related to deficits and diagnosis.       Plan for Next Visit:  Assess presentation to clinic and patients response to the last visit following IE, manual therapy intervention, therapeutic exercises, pt education, and HEP.  Manual therapy to address alignment, mobility, flexibility, tenderness, soft tissue restrictions and/or presence of trigger points.  Add stretching, stabilization, mobilization and strengthening exercises as appropriate.  Modalitiesif indicated.         Patient Education and Home Exercises       Education provided:   - PT provided education and demonstration of HEP to include:   Eval findings with education and demonstration regarding posture, postural awareness, sitting ergonomics, ergonomics with  driving, joint and soft tissue deficits, body mechanics, arthrokinematics, diagnosis, functional limitations related to deficits and diagnosis.     Self correction of Right sided pelvic dowsnlip with AI.  Sidelying opening technique to Right Quadratus Lumborum with pillow roll at side  Modified prayer stretch opening Right side of trunk.      Written Home Exercises Provided: Patient instructed to cont prior HEP.     Exercises were reviewed and Deanna was able to demonstrate them prior to the end of the session.  Deanna demonstrated fair  understanding of the education provided. See Electronic Medical Record under Patient Instructions for exercises provided during therapy sessions    Assessment     Improved pain since last session but still having pain at night. Did home exercise program but unsure of her form, so reviewed. Presented with pelvic dysfunction which was corrected with manual therapy and muscle energy technique. Trials through variations of modified prayers stretch to reduce shoulder strain as this is where t was feeling more of the stretch. Educated pt on importance of not overstretching during third trimester for joint protection. Discomfort with attempts at stabilization of scapulae with scap retract and no money, so stopped. Denied pain after session.  Will benefit from continued physical therapy intervention to progress toward goals set forth in plan of care to improve functional mobility and quality of life.     Deanna Is progressing well towards her goals.   Patient prognosis is Good.     Patient will continue to benefit from skilled outpatient physical therapy to address the deficits listed in the problem list box on initial evaluation, provide pt/family education and to maximize pt's level of independence in the home and community environment.     Patient's spiritual, cultural and educational needs considered and pt agreeable to plan of care and goals.     Anticipated barriers to physical therapy:  Schedule conflicts, transportation, pain, guarding, tolerance, endurance, posture, postural awareness, joint and soft tissue deficits, 3rd trimester of pregnancy     Goals:   Short-Term: 4 weeks (3/1/2024)  ongoing  Pt will improve bilateralActive shouder flexion to > or = 160* to promote return to prior functional status.  Pt will improve bilateralActive shouder abduction to > or = 160* to promote return to prior functional status.  Pt will improve trunk alignment with decreased Right Quadratus Lumborum spasming allowing more symmetrical pelvic and lumbar alignment.  Pt will improve flexibility of Right periscapular and lumbar muscles to help promoted better mobility, posture, alignment, and help return to prior functional status.  Pt will verbalize understanding and independence with good sitting posture and ergonomics.  Pt will decrease pain levels < or = to 4-5/10 to help promote appropriate progression of PT, HEP, and ADL's    Pt will be compliant with new home exercise program to assist with PT intervention and help allow appropriate progression through plan of care.     Long-Term: 12 weeks (4/26/2024)  ongoing  Pt will improve bilateral shoulder flexion/abduction AROM > or = 170 to allow return to normal activities of daily living.  Pt will improve bilateral shoulder strength to > or = 5/5 to allow performance of activities of daily living.  Pt will improve trunk and scap-thoracic strength/stabilization to > or = to GOOD to allow better posture, alignment, and help return to prior functional status.  Pt will have > or = to GOOD flexibility of her shoulder, periscapular and trunk muscles to help promoted better mobility, posture, alignment, and help return to prior functional status.  Pt will demonstrate GOOD postural habits to improve QOL and allow return to prior functional status.  Pt will report < or = 2/10 pain during activities of daily living to improve QOL and allow return to prior functional status.    Pt will be compliant and independent with HEP to allow and maintain better participation in normal activities  Pt will be able to resume normals ADL's, IADL's, previous activities, fitness, and work related tasks     Plan     Continue per POC, progressing as appropriate to achieve stated goals.    Continue with: Plan of care Certification: 2/5/2024 to 4/26/2024.     Outpatient Physical Therapy 1-2 times weekly for 12 weeks to include the following interventions: Electrical Stimulation attended/unattended, Gait Training, Manual Therapy, Moist Heat/ Ice, Neuromuscular Re-ed, Orthotic Management and Training, Patient Education, Therapeutic Activities, Therapeutic Exercise, dry needling and Ultrasound.     Emily Willson, PTA

## 2024-02-15 ENCOUNTER — HOSPITAL ENCOUNTER (OUTPATIENT)
Dept: RADIOLOGY | Facility: HOSPITAL | Age: 29
Discharge: HOME | End: 2024-02-15
Attending: OBSTETRICS & GYNECOLOGY
Payer: COMMERCIAL

## 2024-02-15 ENCOUNTER — CLINICAL SUPPORT (OUTPATIENT)
Dept: REHABILITATION | Facility: HOSPITAL | Age: 29
End: 2024-02-15
Payer: COMMERCIAL

## 2024-02-15 DIAGNOSIS — M53.80 BACK TIGHTNESS: ICD-10-CM

## 2024-02-15 DIAGNOSIS — M89.8X1 PAIN IN SCAPULA: Primary | ICD-10-CM

## 2024-02-15 DIAGNOSIS — M25.69 BACK STIFFNESS: ICD-10-CM

## 2024-02-15 DIAGNOSIS — O26.843 UTERINE SIZE DATE DISCREPANCY PREGNANCY, THIRD TRIMESTER: ICD-10-CM

## 2024-02-15 PROCEDURE — 97110 THERAPEUTIC EXERCISES: CPT | Mod: PN,CQ

## 2024-02-15 PROCEDURE — 97140 MANUAL THERAPY 1/> REGIONS: CPT | Mod: PN,CQ

## 2024-02-15 PROCEDURE — 76816 OB US FOLLOW-UP PER FETUS: CPT | Mod: 26,,, | Performed by: RADIOLOGY

## 2024-02-15 PROCEDURE — 76816 OB US FOLLOW-UP PER FETUS: CPT | Mod: TC,PN

## 2024-02-15 PROCEDURE — 97112 NEUROMUSCULAR REEDUCATION: CPT | Mod: PN,CQ

## 2024-02-15 NOTE — PROGRESS NOTES
[facility]   Physical Therapy Treatment Note      Name: Phylicia Felton [facility]  Clinic Number: [id number]    Therapy Diagnosis:   Encounter Diagnoses   Name Primary?    Pain in scapula Yes    Back stiffness     Back tightness        Physician: Abena Bah MD    Visit Date: 2/15/2024    Physician Orders: PT Eval and Treat   Medical Diagnosis from Referral: Acute pain of right shoulder [M25.511]   Evaluation Date: 2/5/2024  Authorization Period Expiration: 12/31/2024  Plan of Care Expiration: 4/26/2024  Progress Note Due: 3/1/2024  Visit # / Visits authorized: 2 / 20 +eval   FOTO: Issued Visit #: 1/3, (capture on visit 1, 5, 10) first on 2/5/2024     Precautions: Standard, 3rd trimester (37 weeks at time of Initial Evaluation)     Time In: 0925   Time Out: 1002   Total Time: 37 minutes  Total Billable Time: 37 minutes    PTA Visit #: 2/5       Subjective     Patient reports: still having same pains as last reported. Occasional sharp nerve pain in medial right periscapular area 6-7/10.   .  She was compliant with home exercise program.  Response to previous treatment: improving pain  Functional change: none stated    Pain: 0/10  Location: right inferior scapular border.      Objective      38 weeks gestation 2/15/2024    Posture/alignment:    Sitting with Right scapula mildly elevated and protracted vs Left side.  Fair thoracic positioning.  Mild forward head posture.     Standing:   with Right scapula and iliac crest resting inferior vs Left side.     Supine:  Right sided pelvic dowsnlip with AI.  ~ 6mm Right leg length discrepancy.    Treatment     Deanna received the treatments listed below:      manual therapy techniques: for 15 minutes, including:  Sidelying opening technique (pillow roll at hip) with myofascial release and STM to Right Quadratus Lumborum and Lower Trapezius, right UT  Muscle energy technique correction of Right sided pelvic downslip with AI     therapeutic  exercises for 08 minutes including:  Self correction of Right sided pelvic downslip with AI  Sidelying opening stretch to Right Quadratus Lumborum  NP-Bent over modified prayer stretch to open Right side of trunk 3 x 10s-trial of variations of this stretch to reduce strain through shoulders  NP-Posterior shoulder rolls x 20     neuromuscular re-education activities for 14 minutes. The following activities were included:  Self correction of Right sided pelvic dowsnlip with AI.  Sidelying opening technique to Right Quadratus Lumborum with pillow roll at side  NP-Modified prayer stretch opening Right side of trunk  Sidelying Right hip/pelvic hike 3 x 10  Sidelying Right pelvic retraction 3 x 10  Sidelying Right hip/pelvic hike + AAROM LLR 3 x 10  Sidelying right scapular retraction with depression 2 x 10  NP-Scap retraction x 10-stopped 2* right UT discomfort  NP-No money x 5-stopped 2* right UT discomfort.          therapeutic activities for 00 minutes, including:  Eval findings with education and demonstration regarding posture, postural awareness, sitting ergonomics, ergonomics with driving, joint and soft tissue deficits, body mechanics, arthrokinematics, diagnosis, functional limitations related to deficits and diagnosis.       Plan for Next Visit:  Assess presentation to clinic and patients response to the last visit following IE, manual therapy intervention, therapeutic exercises, pt education, and HEP.  Manual therapy to address alignment, mobility, flexibility, tenderness, soft tissue restrictions and/or presence of trigger points.  Add stretching, stabilization, mobilization and strengthening exercises as appropriate.  Modalitiesif indicated.         Patient Education and Home Exercises       Education provided:   - PT provided education and demonstration of HEP to include:   Eval findings with education and demonstration regarding posture, postural awareness, sitting ergonomics, ergonomics with driving,  joint and soft tissue deficits, body mechanics, arthrokinematics, diagnosis, functional limitations related to deficits and diagnosis.     Self correction of Right sided pelvic dowsnlip with AI.  Sidelying opening technique to Right Quadratus Lumborum with pillow roll at side  Modified prayer stretch opening Right side of trunk.      Written Home Exercises Provided: Patient instructed to cont prior HEP.     Exercises were reviewed and Deanna was able to demonstrate them prior to the end of the session.  Deanna demonstrated fair  understanding of the education provided. See Electronic Medical Record under Patient Instructions for exercises provided during therapy sessions    Assessment     Continued pain in same areas. Presented with right pelvic dysfunction which was corrected with manual therapy and muscle energy technique. Reviewed muscle energy technique for management of symptoms. . Denied pain after session. Will benefit from continued physical therapy intervention to progress toward goals set forth in plan of care to improve functional mobility and quality of life.     Deanna Is progressing well towards her goals.   Patient prognosis is Good.     Patient will continue to benefit from skilled outpatient physical therapy to address the deficits listed in the problem list box on initial evaluation, provide pt/family education and to maximize pt's level of independence in the home and community environment.     Patient's spiritual, cultural and educational needs considered and pt agreeable to plan of care and goals.     Anticipated barriers to physical therapy: Schedule conflicts, transportation, pain, guarding, tolerance, endurance, posture, postural awareness, joint and soft tissue deficits, 3rd trimester of pregnancy     Goals:   Short-Term: 4 weeks (3/1/2024)  ongoing  Pt will improve bilateralActive shouder flexion to > or = 160* to promote return to prior functional status.  Pt will improve bilateralActive shouder  abduction to > or = 160* to promote return to prior functional status.  Pt will improve trunk alignment with decreased Right Quadratus Lumborum spasming allowing more symmetrical pelvic and lumbar alignment.  Pt will improve flexibility of Right periscapular and lumbar muscles to help promoted better mobility, posture, alignment, and help return to prior functional status.  Pt will verbalize understanding and independence with good sitting posture and ergonomics.  Pt will decrease pain levels < or = to 4-5/10 to help promote appropriate progression of PT, HEP, and ADL's    Pt will be compliant with new home exercise program to assist with PT intervention and help allow appropriate progression through plan of care.     Long-Term: 12 weeks (4/26/2024)  ongoing  Pt will improve bilateral shoulder flexion/abduction AROM > or = 170 to allow return to normal activities of daily living.  Pt will improve bilateral shoulder strength to > or = 5/5 to allow performance of activities of daily living.  Pt will improve trunk and scap-thoracic strength/stabilization to > or = to GOOD to allow better posture, alignment, and help return to prior functional status.  Pt will have > or = to GOOD flexibility of her shoulder, periscapular and trunk muscles to help promoted better mobility, posture, alignment, and help return to prior functional status.  Pt will demonstrate GOOD postural habits to improve QOL and allow return to prior functional status.  Pt will report < or = 2/10 pain during activities of daily living to improve QOL and allow return to prior functional status.   Pt will be compliant and independent with HEP to allow and maintain better participation in normal activities  Pt will be able to resume normals ADL's, IADL's, previous activities, fitness, and work related tasks     Plan     Continue per POC, progressing as appropriate to achieve stated goals.    Continue with: Plan of care Certification: 2/5/2024 to  4/26/2024.     Outpatient Physical Therapy 1-2 times weekly for 12 weeks to include the following interventions: Electrical Stimulation attended/unattended, Gait Training, Manual Therapy, Moist Heat/ Ice, Neuromuscular Re-ed, Orthotic Management and Training, Patient Education, Therapeutic Activities, Therapeutic Exercise, dry needling and Ultrasound.     Emily Willson, PTA

## 2024-02-21 ENCOUNTER — ROUTINE PRENATAL (OUTPATIENT)
Dept: OBSTETRICS AND GYNECOLOGY | Facility: CLINIC | Age: 29
End: 2024-02-21
Payer: COMMERCIAL

## 2024-02-21 VITALS
DIASTOLIC BLOOD PRESSURE: 74 MMHG | WEIGHT: 204.81 LBS | BODY MASS INDEX: 31.14 KG/M2 | SYSTOLIC BLOOD PRESSURE: 122 MMHG

## 2024-02-21 DIAGNOSIS — Z34.03 ENCOUNTER FOR SUPERVISION OF NORMAL FIRST PREGNANCY IN THIRD TRIMESTER: Primary | ICD-10-CM

## 2024-02-21 PROCEDURE — 99999 PR PBB SHADOW E&M-EST. PATIENT-LVL II: CPT | Mod: PBBFAC,,, | Performed by: OBSTETRICS & GYNECOLOGY

## 2024-02-21 PROCEDURE — 0502F SUBSEQUENT PRENATAL CARE: CPT | Mod: CPTII,S$GLB,, | Performed by: OBSTETRICS & GYNECOLOGY

## 2024-02-21 NOTE — PROGRESS NOTES
Subjective:      Phylicia Chamberlain is a 28 y.o. female being seen today for her obstetrical visit. She is at 39w1d gestation. Patient reports no complaints.     OB ROS: no vaginal bleeding, no leakage of fluid, no contractions/cramping, no vaginal discharge. Fetal movement: normal.    Menstrual History:  OB History          1    Para   0    Term   0       0    AB   0    Living   0         SAB   0    IAB   0    Ectopic   0    Multiple   0    Live Births                    Patient's last menstrual period was 2023 (approximate).       Objective:      /74   Wt 92.9 kg (204 lb 12.9 oz)   LMP 2023 (Approximate)   BMI 31.14 kg/m²     Vitals  BP: 122/74  Weight: 92.9 kg (204 lb 12.9 oz)  Prenatal  Fetal Heart Rate: 143  Movement: Present  Fundal height size equal to dates  17.1 kg (37 lb 12.9 oz)           Prenatal Labs:  Lab Results   Component Value Date    GROUPTRH A POS 2023    HGB 13.1 2024    HCT 36.5 (L) 2024     2024    RUBELLAIMMUN Reactive 2023    HEPBSAG Non-reactive 2023    [id number] Non-reactive 2023    RPR Non-reactive 2023    LABCHLA Not Detected 2023    LABNGO Not Detected 2023    LABURIN No significant growth 2023    QUADSCREEN Negative 2023    OBGLUCOSESCR 108 2023    STREPBCULT No Group B Streptococcus isolated 2024       Assessment:      Pregnancy 39w1d      Plan:     Final EDC 2023  GBS neg  Vaccines: Declined COVID, Tdap, flu, & RSV  BMI 25: recommended 15-25# gain  Genetic: Quad 2023, surprise  Peds: Tempt[facility]  Epidural  Breastfeeding, has pump  Contraception: condoms  Growth ultrasound: 35.1wga, EFW 2681g, 56%, AC 69%, SAHINA 17, VTX, post placenta  38wk ultrasound 7#10oz        1. Encounter for supervision of normal first pregnancy in third trimester  -     POCT Urinalysis(Instrument)         Follow up in 1 weeks.       I reviewed today her blood pressure,  weight gain, urine results and  fetal heart rate.  I have reviewed the previous labs and ultrasound with the patient.   I have answered questions to her satisfaction and total of 20 minutes spend today

## 2024-02-28 ENCOUNTER — ROUTINE PRENATAL (OUTPATIENT)
Dept: OBSTETRICS AND GYNECOLOGY | Facility: CLINIC | Age: 29
End: 2024-02-28
Payer: COMMERCIAL

## 2024-02-28 VITALS — BODY MASS INDEX: 31.17 KG/M2 | DIASTOLIC BLOOD PRESSURE: 70 MMHG | WEIGHT: 205 LBS | SYSTOLIC BLOOD PRESSURE: 122 MMHG

## 2024-02-28 DIAGNOSIS — Z3A.40 40 WEEKS GESTATION OF PREGNANCY: ICD-10-CM

## 2024-02-28 DIAGNOSIS — Z34.03 ENCOUNTER FOR SUPERVISION OF NORMAL FIRST PREGNANCY IN THIRD TRIMESTER: Primary | ICD-10-CM

## 2024-02-28 LAB
BILIRUBIN, UA POC OHS: NEGATIVE
BLOOD, UA POC OHS: NEGATIVE
CLARITY, UA POC OHS: ABNORMAL
COLOR, UA POC OHS: YELLOW
GLUCOSE, UA POC OHS: NEGATIVE
KETONES, UA POC OHS: NEGATIVE
LEUKOCYTES, UA POC OHS: ABNORMAL
NITRITE, UA POC OHS: NEGATIVE
PH, UA POC OHS: 7
PROTEIN, UA POC OHS: NEGATIVE
SPECIFIC GRAVITY, UA POC OHS: 1.01
UROBILINOGEN, UA POC OHS: 0.2

## 2024-02-28 PROCEDURE — 59025 FETAL NON-STRESS TEST: CPT | Mod: S$GLB,,, | Performed by: OBSTETRICS & GYNECOLOGY

## 2024-02-28 PROCEDURE — 0502F SUBSEQUENT PRENATAL CARE: CPT | Mod: CPTII,S$GLB,, | Performed by: OBSTETRICS & GYNECOLOGY

## 2024-02-28 PROCEDURE — 99999 PR PBB SHADOW E&M-EST. PATIENT-LVL II: CPT | Mod: PBBFAC,,, | Performed by: OBSTETRICS & GYNECOLOGY

## 2024-02-28 NOTE — PROGRESS NOTES
Subjective:      Phylicia Chamberlain is a 28 y.o. female being seen today for her obstetrical visit. She is at 40w1d gestation. Patient reports no complaints.     OB ROS: no vaginal bleeding, no leakage of fluid, no contractions/cramping, no vaginal discharge. Fetal movement: normal.    Menstrual History:  OB History          1    Para   0    Term   0       0    AB   0    Living   0         SAB   0    IAB   0    Ectopic   0    Multiple   0    Live Births                    Patient's last menstrual period was 2023 (approximate).       Objective:      /70   Wt 93 kg (205 lb 0.4 oz)   LMP 2023 (Approximate)   BMI 31.17 kg/m²     Vitals  BP: 122/70  Weight: 93 kg (205 lb 0.4 oz)  Prenatal  Fetal Heart Rate: 148  Movement: Present  Dilation/Effacement/Station  Dilation: 4.5  Effacement (%): 70  Station: -2  Fundal height size equal to dates  17.2 kg (38 lb 0.4 oz)           Prenatal Labs:  Lab Results   Component Value Date    GROUPTRH A POS 2023    HGB 13.1 2024    HCT 36.5 (L) 2024     2024    RUBELLAIMMUN Reactive 2023    HEPBSAG Non-reactive 2023    [id number] Non-reactive 2023    RPR Non-reactive 2023    LABCHLA Not Detected 2023    LABNGO Not Detected 2023    LABURIN No significant growth 2023    QUADSCREEN Negative 2023    OBGLUCOSESCR 108 2023    STREPBCULT No Group B Streptococcus isolated 2024       Assessment:      Pregnancy 40w1d      Plan:     Final EDC 2023  GBS neg  Vaccines: Declined COVID, Tdap, flu, & RSV  BMI 25: recommended 15-25# gain  Genetic: Quad 2023, surprise  Peds: Tempt[facility]  Epidural  Breastfeeding, has pump  Contraception: condoms  Growth ultrasound: 35.1wga, EFW 2681g, 56%, AC 69%, SHAINA 17, VTX, post placenta        1. Encounter for supervision of normal first pregnancy in third trimester  -     POCT Urinalysis(Instrument)    2. 40 weeks gestation  of pregnancy         Follow up in 1 weeks.       I reviewed today her blood pressure, weight gain, urine results and  fetal heart rate.  I have reviewed the previous labs and ultrasound with the patient.   I have answered questions to her satisfaction and total of 20 minutes spend today

## 2024-02-28 NOTE — PROCEDURES
Procedures  FETAL SURVEILLANCE TESTING SUMMARY  NST    INDICATIONS:  post-dates pregnancy    Fetal doppler heart rate tracing obtained in the usual fashion with the patient in the left supine position.    Duration of monitorinmin    OBJECTIVE RESULTS:  Baseline fetal heart rate: 120 bpm    Fetal heart variability: moderate  Fetal Heart Rate decelerations: none  Fetal acoustic stimulator: no  Fetal Heart Rate accelerations: yes  Baseline FHR: 120 per minute  Fetal Non-stress Test: reactive  Amniotic Fluid Index: not indicated  Uterine irritability: no  Uterine contractions: regular, every 5 minutes  Biophysical Profile: BPP Score 8/8    Fetal surveillance: reassuring

## 2024-03-01 ENCOUNTER — ROUTINE PRENATAL (OUTPATIENT)
Dept: OBSTETRICS AND GYNECOLOGY | Facility: CLINIC | Age: 29
End: 2024-03-01
Payer: COMMERCIAL

## 2024-03-01 VITALS
DIASTOLIC BLOOD PRESSURE: 72 MMHG | SYSTOLIC BLOOD PRESSURE: 114 MMHG | WEIGHT: 206.13 LBS | BODY MASS INDEX: 31.34 KG/M2

## 2024-03-01 DIAGNOSIS — Z3A.40 40 WEEKS GESTATION OF PREGNANCY: Primary | ICD-10-CM

## 2024-03-01 LAB
BILIRUBIN, UA POC OHS: NEGATIVE
BLOOD, UA POC OHS: NEGATIVE
CLARITY, UA POC OHS: CLEAR
COLOR, UA POC OHS: YELLOW
GLUCOSE, UA POC OHS: NEGATIVE
KETONES, UA POC OHS: NEGATIVE
LEUKOCYTES, UA POC OHS: NEGATIVE
NITRITE, UA POC OHS: NEGATIVE
PH, UA POC OHS: 7
PROTEIN, UA POC OHS: NEGATIVE
SPECIFIC GRAVITY, UA POC OHS: 1.01
UROBILINOGEN, UA POC OHS: 0.2

## 2024-03-01 PROCEDURE — 99999 PR PBB SHADOW E&M-EST. PATIENT-LVL II: CPT | Mod: PBBFAC,,,

## 2024-03-01 PROCEDURE — 0502F SUBSEQUENT PRENATAL CARE: CPT | Mod: CPTII,S$GLB,,

## 2024-03-01 PROCEDURE — 59025 FETAL NON-STRESS TEST: CPT | Mod: S$GLB,,,

## 2024-03-01 NOTE — PROGRESS NOTES
The patient presents with complaints of irregular mildly uncomfortable ctx today.   Reports: Good fetal movements reported. No Bleeding or pains    4.5cm dilated at last appt. Desires membrane sweep today    3/1/2024  28 y.o. 40w3d Estimated Date of Delivery: 24, dating reviewed.   OB History    Para Term  AB Living   1 0 0 0 0 0   SAB IAB Ectopic Multiple Live Births   0 0 0 0        # Outcome Date GA Lbr Addy/2nd Weight Sex Delivery Anes PTL Lv   1 Current                Prenatal labs reviewed and updated today    Review of Systems:  General ROS: negative for headache or visual changes  Breast ROS: negative for breast lumps  Gastrointestinal ROS: negative for constipation, diarrhea or nausea/vomiting  Musculoskeletal ROS: negative for pain in joints or swelling in face or hands.   Neurological ROS: negative for - headaches, numbness/tingling or visual changes      Physical Exam:  /72   Wt 93.5 kg (206 lb 2.1 oz)   LMP 2023 (Approximate)   BMI 31.34 kg/m²   Urine Dip: Pending  Fetal Heart Tones: 130 bpm  Constitutional: She is oriented to person, place, and time. She appears well-developed and well-nourished. No distress. Normal weight  Cardiovascular: Normal rate.    Pulmonary/Chest: Effort normal. No respiratory distress  Abdominal: Soft, gravid, nontender. No rebound and no guarding.     Genitourinary: /-2, membrane sweep done, no bleeding    Musculoskeletal: Normal range of motion, Minimal peripheral edema.   Neurological: She is alert and oriented to person, place, and time. Coordination normal.   Skin: Skin is warm and dry. She is not diaphoretic.  Psychiatric: She has a normal mood and affect.        Assessment:  28 y.o., at 40w3d Gestation   Patient Active Problem List   Diagnosis    ANABEL (generalized anxiety disorder)    Pain in scapula    Back stiffness    Back tightness     Current Outpatient Medications on File Prior to Visit   Medication Sig Dispense Refill     PRENATAL 105-IRON-FOLIC AC-DHA ORAL Take by mouth.       No current facility-administered medications on file prior to visit.     FETAL SURVEILLANCE TESTING SUMMARY    INDICATIONS:  post-dates pregnancy    Fetal doppler heart rate tracing obtained in the usual fashion with the patient in the left supine position.    Duration of monitorin min    OBJECTIVE RESULTS:  Baseline fetal heart rate: 130 bpm    Fetal heart variability: moderate  Fetal Heart Rate decelerations: none  Fetal acoustic stimulator: no  Fetal Heart Rate accelerations: yes  Baseline FHR: 130 per minute  Fetal Non-stress Test: reactive    Fetal surveillance: reassuring      Plan:   Labs today: none  Orders today: none  MEds today: none  Procedures Today: Nst- reassuring  Follow up Monday, bleeding/pain precautions, kick counts, labor precautions

## 2024-03-14 ENCOUNTER — POSTPARTUM VISIT (OUTPATIENT)
Dept: OBSTETRICS AND GYNECOLOGY | Facility: CLINIC | Age: 29
End: 2024-03-14
Payer: COMMERCIAL

## 2024-03-14 VITALS
DIASTOLIC BLOOD PRESSURE: 70 MMHG | BODY MASS INDEX: 28.46 KG/M2 | WEIGHT: 187.19 LBS | SYSTOLIC BLOOD PRESSURE: 118 MMHG

## 2024-03-14 PROCEDURE — 99999 PR PBB SHADOW E&M-EST. PATIENT-LVL III: CPT | Mod: PBBFAC,,, | Performed by: OBSTETRICS & GYNECOLOGY

## 2024-03-14 PROCEDURE — 0503F POSTPARTUM CARE VISIT: CPT | Mod: S$GLB,,, | Performed by: OBSTETRICS & GYNECOLOGY

## 2024-03-14 PROCEDURE — 1111F DSCHRG MED/CURRENT MED MERGE: CPT | Mod: CPTII,S$GLB,, | Performed by: OBSTETRICS & GYNECOLOGY

## 2024-03-14 NOTE — PROGRESS NOTES
Chief Complaint   Patient presents with    Postpartum Care       28 y.o. here for 2 week postpartum exam without complaint.   Admitted at 40.3wga in labor  She is s/p spontaneous vaginal delivery 3/1/2024  2nd degree laceration noted and repaired in normal fashion with 2.0 vicryl   Information for the patient's :  Geetha Chamberlain [[id number]]     Apgars    Living status: Living  Apgar Component Scores:  1 min.:  5 min.:  10 min.:  15 min.:  20 min.:    Skin color:  0  1       Heart rate:  2  2       Reflex irritability:  2  2       Muscle tone:  2  2       Respiratory effort:  2  2       Total:  8  9       Apgars assigned by: THOMAS DE LEON        She has no mood complaints.   Minimal lochia.   No pain.  Breast feeding baby  Contraception: condoms   Last pap: 2023 NILM, next 2026  Hemoglobin   Date Value Ref Range Status   2024 11.8 (L) 12.0 - 16.0 g/dL Final   2024 11.9 (L) 12.0 - 16.0 g/dL Final     Hematocrit   Date Value Ref Range Status   2024 34.0 (L) 37.0 - 48.5 % Final   2024 33.6 (L) 37.0 - 48.5 % Final     PE:  /70   Wt 84.9 kg (187 lb 2.7 oz)   LMP 2023 (Approximate)   Breastfeeding Yes   BMI 28.46 kg/m²   Body mass index is 28.46 kg/m².  Constitutional: She appears well-developed and well-nourished. No distress.   Abdominal: Soft. She exhibits no distension and no mass. There is no tenderness. There is no rebound and no guarding.   Genitourinary: OBGyn Exam deferred  Psychiatric: She has a normal mood and affect.    Assessment:  2 week interval PP exam, doing well    Plan:  Follow up 4wk  Contraception: condoms  Next pap 2026

## 2024-04-15 ENCOUNTER — POSTPARTUM VISIT (OUTPATIENT)
Dept: OBSTETRICS AND GYNECOLOGY | Facility: CLINIC | Age: 29
End: 2024-04-15
Payer: COMMERCIAL

## 2024-04-15 VITALS
SYSTOLIC BLOOD PRESSURE: 114 MMHG | DIASTOLIC BLOOD PRESSURE: 66 MMHG | WEIGHT: 187.38 LBS | BODY MASS INDEX: 28.49 KG/M2

## 2024-04-15 DIAGNOSIS — L92.9 GRANULATION TISSUE AT OBSTETRICAL LACERATION SITE: ICD-10-CM

## 2024-04-15 PROCEDURE — 17250 CHEM CAUT OF GRANLTJ TISSUE: CPT | Mod: 58,S$GLB,, | Performed by: OBSTETRICS & GYNECOLOGY

## 2024-04-15 PROCEDURE — 99213 OFFICE O/P EST LOW 20 MIN: CPT | Mod: 24,25,S$GLB, | Performed by: OBSTETRICS & GYNECOLOGY

## 2024-04-15 PROCEDURE — 99999 PR PBB SHADOW E&M-EST. PATIENT-LVL III: CPT | Mod: PBBFAC,,, | Performed by: OBSTETRICS & GYNECOLOGY

## 2024-04-15 RX ORDER — ESTRADIOL 0.1 MG/G
1 CREAM VAGINAL NIGHTLY
Qty: 42.5 G | Refills: 0 | Status: SHIPPED | OUTPATIENT
Start: 2024-04-15 | End: 2024-04-17

## 2024-04-15 NOTE — PROGRESS NOTES
Chief Complaint   Patient presents with    Postpartum Care       28 y.o. here for postpartum exam without complaint.   Admitted at 40.3wga in labor  She is s/p spontaneous vaginal delivery 3/1/2024  2nd degree laceration noted and repaired in normal fashion with 2.0 vicryl   Information for the patient's :  Geetha Chamberlain  [[id number]]     Apgars    Living status: Living  Apgar Component Scores:  1 min.:  5 min.:  10 min.:  15 min.:  20 min.:    Skin color:  0  1       Heart rate:  2  2       Reflex irritability:  2  2       Muscle tone:  2  2       Respiratory effort:  2  2       Total:  8  9       Apgars assigned by: THOMAS DE LEON        She has no mood complaints.   Minimal lochia.   No pain.  Breast feeding baby  Contraception: condoms   Last pap: 2023 NILM, next 2026  Hemoglobin   Date Value Ref Range Status   2024 11.8 (L) 12.0 - 16.0 g/dL Final   2024 11.9 (L) 12.0 - 16.0 g/dL Final     Hematocrit   Date Value Ref Range Status   2024 34.0 (L) 37.0 - 48.5 % Final   2024 33.6 (L) 37.0 - 48.5 % Final     PE:  /66   Wt 85 kg (187 lb 6.3 oz)   LMP 2023 (Approximate)   Breastfeeding Yes   BMI 28.49 kg/m²   Body mass index is 28.49 kg/m².  Constitutional: She appears well-developed and well-nourished. No distress.   Abdominal: Soft. She exhibits no distension and no mass. There is no tenderness. There is no rebound and no guarding.   Genitourinary: Physical Exam    Genitourinary:    Vulva, urethra, bladder, vagina, uterus, right adnexa, left adnexa and urethral meatus normal.   The external female genitalia was normal.     Labial bartholins normal.There is no tenderness, lesion or injury on the right labia. There is no tenderness, lesion or injury on the left labia. There is granulation tissue in the vagina. Cervix is normal.     Psychiatric: She has a normal mood and affect.    Assessment:  interval PP exam, doing well    Plan:  Follow up Annual  Contraception:  condoms  Next pap 2/2026     Granulation tissue cauterized. Start estrogen to help healing.

## 2024-04-16 ENCOUNTER — PATIENT MESSAGE (OUTPATIENT)
Dept: OBSTETRICS AND GYNECOLOGY | Facility: CLINIC | Age: 29
End: 2024-04-16
Payer: COMMERCIAL

## 2024-04-16 DIAGNOSIS — L92.9 GRANULATION TISSUE AT OBSTETRICAL LACERATION SITE: Primary | ICD-10-CM

## 2024-05-14 ENCOUNTER — POSTPARTUM VISIT (OUTPATIENT)
Dept: OBSTETRICS AND GYNECOLOGY | Facility: CLINIC | Age: 29
End: 2024-05-14
Payer: COMMERCIAL

## 2024-05-14 VITALS
SYSTOLIC BLOOD PRESSURE: 118 MMHG | BODY MASS INDEX: 28.46 KG/M2 | WEIGHT: 187.19 LBS | DIASTOLIC BLOOD PRESSURE: 70 MMHG

## 2024-05-14 DIAGNOSIS — L92.9 GRANULATION TISSUE AT OBSTETRICAL LACERATION SITE: Primary | ICD-10-CM

## 2024-05-14 PROCEDURE — 99999 PR PBB SHADOW E&M-EST. PATIENT-LVL III: CPT | Mod: PBBFAC,,, | Performed by: OBSTETRICS & GYNECOLOGY

## 2024-05-14 PROCEDURE — 99213 OFFICE O/P EST LOW 20 MIN: CPT | Mod: 24,25,S$GLB, | Performed by: OBSTETRICS & GYNECOLOGY

## 2024-05-14 PROCEDURE — 17250 CHEM CAUT OF GRANLTJ TISSUE: CPT | Mod: 58,S$GLB,, | Performed by: OBSTETRICS & GYNECOLOGY

## 2024-05-14 NOTE — PROGRESS NOTES
Chief Complaint   Patient presents with    Postpartum Care       28 y.o. here for postpartum exam without complaint.   Admitted at 40.3wga in labor  She is s/p spontaneous vaginal delivery 3/1/2024  2nd degree laceration noted and repaired in normal fashion with 2.0 vicryl   Information for the patient's :  Geetha Chamberlain  [[id number]]     Apgars    Living status: Living  Apgar Component Scores:  1 min.:  5 min.:  10 min.:  15 min.:  20 min.:    Skin color:  0  1       Heart rate:  2  2       Reflex irritability:  2  2       Muscle tone:  2  2       Respiratory effort:  2  2       Total:  8  9       Apgars assigned by: THOMAS DE LEON        She has no mood complaints.   Minimal lochia.   No pain.  Breast feeding baby  Contraception: condoms   Last pap: 2023 NILM, next 2026  Hemoglobin   Date Value Ref Range Status   2024 11.8 (L) 12.0 - 16.0 g/dL Final   2024 11.9 (L) 12.0 - 16.0 g/dL Final     Hematocrit   Date Value Ref Range Status   2024 34.0 (L) 37.0 - 48.5 % Final   2024 33.6 (L) 37.0 - 48.5 % Final     PE:  /70   Wt 84.9 kg (187 lb 2.7 oz)   LMP 2023 (Approximate)   Breastfeeding Yes   BMI 28.46 kg/m²   Body mass index is 28.46 kg/m².  Constitutional: She appears well-developed and well-nourished. No distress.   Abdominal: Soft. She exhibits no distension and no mass. There is no tenderness. There is no rebound and no guarding.   Genitourinary: Physical Exam    Genitourinary:    Vulva, urethra, bladder, vagina, uterus, right adnexa, left adnexa and urethral meatus normal.   The external female genitalia was normal.     Labial bartholins normal.There is no tenderness, lesion or injury on the right labia. There is no tenderness, lesion or injury on the left labia. There is granulation tissue in the vagina. Cervix is normal.     Psychiatric: She has a normal mood and affect.    Assessment:  interval PP exam, doing well    Plan:  Follow up Annual  Contraception:  condoms  Next pap 2/2026     Granulation tissue cauterized. Continue vaginal estrogen.     I spent a total of 20 minutes on the day of the visit.This includes face to face time and non-face to face time preparing to see the patient (eg, review of tests), obtaining and/or reviewing separately obtained history, documenting clinical information in the electronic or other health record, independently interpreting results and communicating results to the patient/family/caregiver, or care coordinator.

## 2024-05-31 ENCOUNTER — PATIENT MESSAGE (OUTPATIENT)
Dept: OBSTETRICS AND GYNECOLOGY | Facility: CLINIC | Age: 29
End: 2024-05-31
Payer: COMMERCIAL

## 2024-06-03 ENCOUNTER — POSTPARTUM VISIT (OUTPATIENT)
Dept: OBSTETRICS AND GYNECOLOGY | Facility: CLINIC | Age: 29
End: 2024-06-03
Payer: COMMERCIAL

## 2024-06-03 VITALS
SYSTOLIC BLOOD PRESSURE: 124 MMHG | BODY MASS INDEX: 28.29 KG/M2 | WEIGHT: 186.06 LBS | DIASTOLIC BLOOD PRESSURE: 70 MMHG

## 2024-06-03 DIAGNOSIS — G56.00 CARPAL TUNNEL SYNDROME, UNSPECIFIED LATERALITY: ICD-10-CM

## 2024-06-03 PROCEDURE — 99999 PR PBB SHADOW E&M-EST. PATIENT-LVL III: CPT | Mod: PBBFAC,,, | Performed by: OBSTETRICS & GYNECOLOGY

## 2024-06-03 PROCEDURE — 99213 OFFICE O/P EST LOW 20 MIN: CPT | Mod: 24,S$GLB,, | Performed by: OBSTETRICS & GYNECOLOGY

## 2024-06-03 NOTE — PROGRESS NOTES
Chief Complaint   Patient presents with    Postpartum Care     Pt states she's been having numbing in her fingers for 3 weeks now.       28 y.o. here for postpartum exam with complains of numbness in fingers were a few weeks.  Admitted at 40.3wga in labor  She is s/p spontaneous vaginal delivery 3/1/2024  2nd degree laceration noted and repaired in normal fashion with 2.0 vicryl   Information for the patient's :  Geetha Chamberlain  [[id number]]     Apgars    Living status: Living  Apgar Component Scores:  1 min.:  5 min.:  10 min.:  15 min.:  20 min.:    Skin color:  0  1       Heart rate:  2  2       Reflex irritability:  2  2       Muscle tone:  2  2       Respiratory effort:  2  2       Total:  8  9       Apgars assigned by: THOMAS DE LEON        She has no mood complaints.   Minimal lochia.   No pain.  Breast feeding baby  Contraception: condoms   Last pap: 2023 NILM, next 2026  Hemoglobin   Date Value Ref Range Status   2024 11.8 (L) 12.0 - 16.0 g/dL Final   2024 11.9 (L) 12.0 - 16.0 g/dL Final     Hematocrit   Date Value Ref Range Status   2024 34.0 (L) 37.0 - 48.5 % Final   2024 33.6 (L) 37.0 - 48.5 % Final     PE:  /70   Wt 84.4 kg (186 lb 1.1 oz)   LMP 2023 (Approximate)   Breastfeeding Yes   BMI 28.29 kg/m²   Body mass index is 28.29 kg/m².  Constitutional: She appears well-developed and well-nourished. No distress.   Abdominal: Soft. She exhibits no distension and no mass. There is no tenderness. There is no rebound and no guarding.   Genitourinary: Physical Exam    Genitourinary:    Vulva, urethra, bladder, vagina, uterus, right adnexa, left adnexa and urethral meatus normal.   The external female genitalia was normal.     Labial bartholins normal.There is no tenderness, lesion or injury on the right labia. There is no tenderness, lesion or injury on the left labia. There is granulation tissue in the vagina. Cervix is normal.     Psychiatric: She has a normal  mood and affect.    Assessment:  interval PP exam, doing well    Plan:  Follow up Annual  Contraception: condoms  Next pap 2/2026   Postpartum exam    Carpal tunnel syndrome, unspecified laterality    Recommended wearing wrist splints.  If no improvement and follow-up with primary care doctor or ortho.      I spent a total of 20 minutes on the day of the visit.This includes face to face time and non-face to face time preparing to see the patient (eg, review of tests), obtaining and/or reviewing separately obtained history, documenting clinical information in the electronic or other health record, independently interpreting results and communicating results to the patient/family/caregiver, or care coordinator.

## 2024-11-25 NOTE — PROGRESS NOTES
Shine Huerta,    Your cholesterol, electrolytes, kidney function, and liver function are all stable with insignificant abnormalities.    Thanks,  Lisa Vegas PA-C
Name band;

## 2024-12-05 ENCOUNTER — OFFICE VISIT (OUTPATIENT)
Dept: OPTOMETRY | Facility: CLINIC | Age: 29
End: 2024-12-05
Payer: COMMERCIAL

## 2024-12-05 DIAGNOSIS — B30.0 EPIDEMIC KERATOCONJUNCTIVITIS OF RIGHT EYE: Primary | ICD-10-CM

## 2024-12-05 PROCEDURE — 1159F MED LIST DOCD IN RCRD: CPT | Mod: CPTII,S$GLB,,

## 2024-12-05 PROCEDURE — 99999 PR PBB SHADOW E&M-EST. PATIENT-LVL II: CPT | Mod: PBBFAC,,,

## 2024-12-05 PROCEDURE — 99203 OFFICE O/P NEW LOW 30 MIN: CPT | Mod: S$GLB,,,

## 2024-12-05 RX ORDER — TOBRAMYCIN AND DEXAMETHASONE 3; 1 MG/ML; MG/ML
1 SUSPENSION/ DROPS OPHTHALMIC 4 TIMES DAILY
Qty: 5 ML | Refills: 1 | Status: SHIPPED | OUTPATIENT
Start: 2024-12-05 | End: 2024-12-15

## 2024-12-05 NOTE — PROGRESS NOTES
HPI    C/o FBS, photophobia, & red since 11/21/24    Pain 6/10. Pt went to [facility] Tuesday and was given Moxifloxacin TID OD. Pt   sts it started to help, but has not cleared.  Last edited by Brittany Montero on 12/5/2024 10:59 AM.            Assessment /Plan     For exam results, see Encounter Report.    Epidemic keratoconjunctivitis of right eye  -     tobramycin-dexAMETHasone 0.3-0.1% (TOBRADEX) 0.3-0.1 % DrpS; Place 1 drop into the right eye 4 (four) times daily. for 10 days  Dispense: 5 mL; Refill: 1      Pt reports OD being red, photophobic, and irritated since 11/21/24. Pt went to urgent care and was given Moxifloxacin drop to use TID OD. Upon examination, pt had injection and chemosis of the conjunctiva along with diffuse subepithelial infiltrates. Advised pt to discontinue Moxifloxacin and begin Tobradex QID OD x 10 days. Advised pt to punctal occlude secondary to breastfeeding status. Discussed chronicity of SEIs with pt and that we may have to taper drop over a longer period. Advised pt to send portal message update early next week and will determine follow-up based on pt's symptoms.    RTC: ~2 weeks for [facility] follow-up OD (pt to send portal update early next week), sooner prn

## 2024-12-10 ENCOUNTER — PATIENT MESSAGE (OUTPATIENT)
Dept: OPTOMETRY | Facility: CLINIC | Age: 29
End: 2024-12-10
Payer: COMMERCIAL

## 2024-12-10 DIAGNOSIS — B30.0 EPIDEMIC KERATOCONJUNCTIVITIS OF RIGHT EYE: Primary | ICD-10-CM

## 2024-12-10 RX ORDER — PREDNISOLONE ACETATE 10 MG/ML
SUSPENSION/ DROPS OPHTHALMIC
Qty: 5 ML | Refills: 1 | Status: SHIPPED | OUTPATIENT
Start: 2024-12-10 | End: 2025-01-07

## 2024-12-17 ENCOUNTER — OFFICE VISIT (OUTPATIENT)
Dept: OPTOMETRY | Facility: CLINIC | Age: 29
End: 2024-12-17
Payer: COMMERCIAL

## 2024-12-17 DIAGNOSIS — B30.0 EPIDEMIC KERATOCONJUNCTIVITIS OF RIGHT EYE: Primary | ICD-10-CM

## 2024-12-17 PROCEDURE — 99999 PR PBB SHADOW E&M-EST. PATIENT-LVL III: CPT | Mod: PBBFAC,,,

## 2024-12-17 PROCEDURE — 99213 OFFICE O/P EST LOW 20 MIN: CPT | Mod: S$GLB,,,

## 2024-12-17 PROCEDURE — 1159F MED LIST DOCD IN RCRD: CPT | Mod: CPTII,S$GLB,,

## 2024-12-17 NOTE — PROGRESS NOTES
HPI    2 wk [facility] OD f/u    Pt using Pred QID and dropping to TID today. Pt has used this AM once. Pt   sts things are better, but not 100%.   Last edited by Brittany Montero on 12/17/2024  8:05 AM.            Assessment /Plan     For exam results, see Encounter Report.    Epidemic keratoconjunctivitis of right eye      Signs and symptoms improving, pt reports still an occasional foreign body sensation with photophobia. Pt on 4-3-2-1 taper and is beginning the 7 days of TID OD. Corneal subepithelial infiltrates still present diffusely, but have decreased in number. Injection and chemosis have resolved. No cells/flare present and no pathology OS. Reviewed all findings with pt and discussed potential need for longer taper depending on how things resolve. Pt to return in 3 weeks for follow-up. Advised pt to call or message sooner if any worsening signs or symptoms.    RTC: ~3 weeks for [facility] follow-up OD, sooner prn.